# Patient Record
Sex: FEMALE | Race: WHITE
[De-identification: names, ages, dates, MRNs, and addresses within clinical notes are randomized per-mention and may not be internally consistent; named-entity substitution may affect disease eponyms.]

---

## 2017-10-31 RX ADMIN — HEPARIN PRN UNIT: 100 SYRINGE at 12:49

## 2017-11-30 LAB — PLATELET COUNT, AUTOMATED: 242 K/UL (ref 150–450)

## 2017-11-30 RX ADMIN — HEPARIN PRN UNIT: 100 SYRINGE at 10:25

## 2017-11-30 NOTE — ONC PROGRESS NOTE - NP.HALSEY
Patient History


Date of Service


Nov 30, 2017





Reason For Visit/HPI


Celsa is here today for a routine follow up visit for surveillance of 

endometrial carcinoma.  





HISTORY OF PRESENT ILLNESS


Celsa is here today for a routine follow up visit for surveillance of 

endometrial carcinoma. She is doing well in general, except for residual 

chronic neuropathy due chemotherapy.  She is currently taking gabapentin 600 mg 

twice daily, with only minimal relief.  She has also increased urinary 

frequency and incontinence. She has chronic edema of the right lower extremity, 

but otherwise she denies acute concerns.





Problem List





(1) Adenocarcinoma of endometrium, stage 3


(2) Peripheral neuropathy





Oncology History


Patient is 84-year-old female who was diagnosed with stage IIIC-1 in August of 2014 after she developed abnormal vaginal bleeding. She underwent endometrial 

biopsy on August 5, 2014 which was positive for endometrial adenocarcinoma, 

FIGO grade 2-3.  Patient was referred to Dr. Tara Wright and underwent total 

abdominal hysterectomy with bilateral salpingo-oophorectomy with 

lymphadenectomy and pelvic washing done on August 20, 2014.  Final pathology 

was positive for endometrioid adenocarcinoma, FIGO grade 3.  The tumor was 5 cm 

with 100% invasion into the myometrium with focal serosal involvement.  

Lymphovascular invasion was positive, but no cervical stromal or parametrial 

involvement.  She was staged as stage IIIC-1 (pT3a pN1).  She received adjuvant 

chemotherapy with carboplatin and Taxol for four cycles, completed on January 8 , 2015.  She also received external beam radiation therapy and intracavitary 

radiation therapy. Her last  from 7/2017 was 5.  from today is 

pending.





Medical History


Past Medical History


1.  Uterine cancer. 


2.  Melanoma of the cheek in 2006. 


3.  Pancreatitis in 2013.  


4.  Atrial fibrillation. 


5. 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


Past Surgical History


1.  Radiacl hysterectomy and BSO. 


2.  Mastectomy. 


3.  Tonsillectomy.


Family History:  


FH: breast cancer


  MOTHER, Onset:68


FH: colon cancer


  GRANDFATHER, Onset:70





Psychosocial History


Social History


Patient never smoked.  She reports she does not drink alcohol or use illicit 

drugs.


Smoking History:  No


Smoking Status:  Never Smoker


Exposure to Second Hand Smoke?:  No





Medications and Allergies


Active Scripts


Gabapentin (GABAPENTIN) 300 Mg Capsule, 300 MG PO BID, #60 CAPSULE


   Prov:ADAN CHRISTINE MD         12/21/16


Reported Medications


Metoprolol Tartrate (METOPROLOL TARTRATE) 25 Mg Tablet, 25 MG PO BID, TAB


   11/30/17


Pyridoxine Hcl (VITAMIN B-6) 25 Mg Tablet, 25 MG PO DAILY


   11/30/17


Cyanocobalamin (Vitamin B-12) (VITAMIN B-12) 1,000 Mcg Tablet, 1000 MCG PO DAILY


   11/30/17


Furosemide (LASIX) 20 Mg Tablet, 1 TAB PO PRN, TAB


   5/5/17


Potassium Gluconate (POTASSIUM) 99 Mg Tablet, 99 MG PO PRN


   5/5/17


Apixaban (ELIQUIS) 2.5 Mg Tablet, 2.5 MG PO


   12/23/16


Multivitamin (MULTI-VITAMIN DAILY) 1 Each Tablet, 1 EACH PO BID


   11/24/16


Calcium Carbonate (CALCIUM) 500 Mg Tab.chew, 500 MG PO BID, TAB.CHEW


   11/24/16


Discontinued Reported Medications


Carvedilol (COREG) 12.5 Mg Tablet, 12.5 MG PO BID, #10 TAB


   5/5/17


Allergies:  


Coded Allergies:  


     No Known Drug Allergies (Unverified , 8/11/17)





Review of System/Physical Exam


Review of Systems


Constitutional:  Denies Appetite/Weight Change, Denies Fever/Chills/Sweating, 

Denies Recent Infection, Denies Other


Cardiovascular:  Denies Chest Pain, Denies Orthopnea, Denies Edema, Denies 

Palpitations, Denies Other


Respiratory:  Denies Cough, Denies Expectoration, Denies Hemoptysis, Denies 

Shortness of Breath, Denies Wheezing, Denies Other


HEENT:  No Tinnitus, No Hearing Problems, No Epistaxis, No Nasal Discharge, No 

Sore Throat, No Mouth Ulcers, No Other


Gastrointestinal:  Other, 


   No Nausea, No Vomitting, No Diarrhea, No Constipation, No Heart Burn, No 

Swallowing Difficulties, No Abdominal Pain


Genitourinary:  Positive for Other (Urinary frequence and incontinence. )


Endocrine:  Denies Diabetes, Denies Hot Flashes, Denies Thyroid Issues, Denies 

Enlarged Lymph Nodes, Denies Other


Hematologic:  Denies Bruising, Denies Bleeding, Denies Fatigue, Denies Weakness

, Denies Other


Musculoskeletal:  Positive for Other (Weakness. )


Neurologic:  Numbness of Hands, Tingling of Hands, Numbness of Feet, Tingling 

of Feet


Psychiatric:  No Anxiety, No Depression, No Other


Skin:  Denies Skin Rash, Denies Lumps, Denies Erythema, Denies Dry Skin, Denies 

Moist Skin, Denies Other





Physical Exam


Vital Signs





Temperature:         97.0 


Pulse:                    115 


BP Systolic:           128 


BP Diastolic:          85 


Respiratory Rate:   16 


O2 SAT:                91 


O2 Delivery:          Room Air  





Height (inches)      65.00  


Weight lb:             145 


Weight oz:            6.0 


Weight Kg (Jeromy):   67.22  





Pain:                    0


General:  Stable, Well Developed, Well Nourished, Not In Acute Distress, Not 

Other


HEENT:  No Trauma, No Conjunctivitis, No Icterus, No Mucositis, No Oral Thrush, 

No Sinus Tenderness, No Other


Neck:  Supple, No Thyromegaly, No Other


Lungs:  Clear to Auscultation, Not Percussed Bilaterally, Not Other


Heart:  Regular Rate, Regular Rhythm, No Gallops, No Murmurs


Abdomen:  Soft and Nontender, No Hepatosplenomegaly, No Masses


Extremities:  No Cyanosis, No Clubbing, No Edema, No Other


Lymphadenopathy:  None


Psychiatric:  Mood appears normal, Affect appears normal


Skin:  No Skin Rashes, No Bruising, No Purpura, No Moist Desquamation, No Dry 

Desquamation, No Erythema, No Mild Erythema, No Moderate Erythema, No Severe 

Erythema, No Induration, No Other


Other


Port in left upper chest noted.





Diagnostic Studies


Diagnostic Studies


Laboratory


 Laboratory Tests


11/30/17 10:30








Laboratory Tests


11/30/17 10:30: 


White Blood Count 5.9, Red Blood Count 4.33, Hemoglobin 13.7, Hematocrit 39.9, 

Mean Corpuscular Volume 92.1, Mean Corpuscular Hemoglobin 31.6, Mean 

Corpuscular Hemoglobin Concent 34.3, Red Cell Distribution Width 14.7, Platelet 

Count 242, Mean Platelet Volume 6.6, Neutrophils (%) (Auto) 72.0, Lymphocytes (%

) (Auto) 16.1, Monocytes (%) (Auto) 8.5, Eosinophils (%) (Auto) 2.3, Basophils (

%) (Auto) 1.1, Nucleated RBC Relative Count (auto) 0.1, Neutrophils # (Auto) 4.2

, Lymphocytes # (Auto) 0.9, Monocytes # (Auto) 0.5, Eosinophils # (Auto) 0.1, 

Basophils # (Auto) 0.1, Nucleated RBC Absolute Count (auto) 0.01, Sodium Level 

140, Potassium Level 4.1, Chloride Level 108, Carbon Dioxide Level 23, Blood 

Urea Nitrogen 13, Creatinine 0.80, Glomerular Filtration Rate Calc > 60.0, 

Random Glucose 86, Calcium Level 9.0, Total Bilirubin 0.7, Aspartate Amino 

Transf (AST/SGOT) 19, Alanine Aminotransferase (ALT/SGPT) 28, Alkaline 

Phosphatase 78, Total Protein 6.6, Albumin 3.8





Assessment and Plan


Assessment & Plan





1.  Stage IIIC (pT3a pN1c M0) endometrial adenocarcinoma, FIGO grade 3 with 100

% invasion of the myometrium. Celsa received adjuvant chemotherapy with 

carboplatin and Taxol for six cycles, completed January 8, 2015.  She received 

also adjuvant external beam radiation therapy and intracavitary seed implant 

therapy.  She is doing very well clinically and without evidence of recurrent 

disease. CA-125 was 5 last visit 7/2017 and pending today. She will return to 

the office for a follow up visit in four months with CBC, CMP and . She 

will have her port flushed monthly. 


2.  Chemotherapy-induced neuropathy. She will continue gabapentin 600 mg twice 

daily.


3.  History of atrial fibrillation. Anticoagulated with Eliquis. 


4.  History of melanoma of the cheek in 2006.





PLAN


1.  Continue active surveillance. 


2.  Patient to return for follow up in 4 months with CBC, CMP and CA-125.


3.  Continue Gabapentin 600 mg 2 times daily.


4.  Patient to contact the clinic for any problems or concerns. 








I personally spent a total of 25 minutes in this visit. Of that 15 minutes was 

counseling/coordination of patient's care.  


See my note above for details.











ALBERTO SALINAS MD Nov 30, 2017 11:43

## 2018-01-08 ENCOUNTER — HOSPITAL ENCOUNTER (OUTPATIENT)
Dept: HOSPITAL 89 - SPU | Age: 83
LOS: 21 days | End: 2018-01-29
Attending: INTERNAL MEDICINE
Payer: MEDICARE

## 2018-01-08 VITALS
BODY MASS INDEX: 23.8 KG/M2 | HEIGHT: 65 IN | WEIGHT: 142.86 LBS | HEIGHT: 65 IN | WEIGHT: 142.86 LBS | BODY MASS INDEX: 23.8 KG/M2

## 2018-01-08 VITALS — DIASTOLIC BLOOD PRESSURE: 58 MMHG | SYSTOLIC BLOOD PRESSURE: 112 MMHG

## 2018-01-08 DIAGNOSIS — Z92.3: ICD-10-CM

## 2018-01-08 DIAGNOSIS — G62.9: ICD-10-CM

## 2018-01-08 DIAGNOSIS — Z85.42: Primary | ICD-10-CM

## 2018-01-08 DIAGNOSIS — Z92.21: ICD-10-CM

## 2018-01-08 DIAGNOSIS — Z79.899: ICD-10-CM

## 2018-01-08 DIAGNOSIS — I48.91: ICD-10-CM

## 2018-01-08 DIAGNOSIS — Z85.820: ICD-10-CM

## 2018-01-08 PROCEDURE — 84295 ASSAY OF SERUM SODIUM: CPT

## 2018-01-08 PROCEDURE — 82040 ASSAY OF SERUM ALBUMIN: CPT

## 2018-01-08 PROCEDURE — 85025 COMPLETE CBC W/AUTO DIFF WBC: CPT

## 2018-01-08 PROCEDURE — 82565 ASSAY OF CREATININE: CPT

## 2018-01-08 PROCEDURE — 84520 ASSAY OF UREA NITROGEN: CPT

## 2018-01-08 PROCEDURE — 84075 ASSAY ALKALINE PHOSPHATASE: CPT

## 2018-01-08 PROCEDURE — 82247 BILIRUBIN TOTAL: CPT

## 2018-01-08 PROCEDURE — 84155 ASSAY OF PROTEIN SERUM: CPT

## 2018-01-08 PROCEDURE — 84450 TRANSFERASE (AST) (SGOT): CPT

## 2018-01-08 PROCEDURE — 36591 DRAW BLOOD OFF VENOUS DEVICE: CPT

## 2018-01-08 PROCEDURE — 86304 IMMUNOASSAY TUMOR CA 125: CPT

## 2018-01-08 PROCEDURE — 82947 ASSAY GLUCOSE BLOOD QUANT: CPT

## 2018-01-08 PROCEDURE — 84460 ALANINE AMINO (ALT) (SGPT): CPT

## 2018-01-08 PROCEDURE — 82310 ASSAY OF CALCIUM: CPT

## 2018-01-08 PROCEDURE — 82435 ASSAY OF BLOOD CHLORIDE: CPT

## 2018-01-08 PROCEDURE — 96523 IRRIG DRUG DELIVERY DEVICE: CPT

## 2018-01-08 PROCEDURE — 84132 ASSAY OF SERUM POTASSIUM: CPT

## 2018-01-08 PROCEDURE — 82374 ASSAY BLOOD CARBON DIOXIDE: CPT

## 2018-01-08 RX ADMIN — HEPARIN PRN UNIT: 100 SYRINGE at 11:39

## 2018-01-12 ENCOUNTER — HOSPITAL ENCOUNTER (EMERGENCY)
Dept: HOSPITAL 89 - ER | Age: 83
Discharge: HOME | End: 2018-01-12
Payer: MEDICARE

## 2018-01-12 VITALS
WEIGHT: 145.38 LBS | WEIGHT: 145.38 LBS | HEIGHT: 65 IN | HEIGHT: 65 IN | BODY MASS INDEX: 24.22 KG/M2 | BODY MASS INDEX: 24.22 KG/M2

## 2018-01-12 VITALS — DIASTOLIC BLOOD PRESSURE: 89 MMHG | SYSTOLIC BLOOD PRESSURE: 148 MMHG

## 2018-01-12 DIAGNOSIS — I48.91: Primary | ICD-10-CM

## 2018-01-12 DIAGNOSIS — R06.02: ICD-10-CM

## 2018-01-12 DIAGNOSIS — R55: ICD-10-CM

## 2018-01-12 DIAGNOSIS — R07.89: ICD-10-CM

## 2018-01-12 LAB — PLATELET COUNT, AUTOMATED: 283 K/UL (ref 150–450)

## 2018-01-12 PROCEDURE — 84295 ASSAY OF SERUM SODIUM: CPT

## 2018-01-12 PROCEDURE — 84132 ASSAY OF SERUM POTASSIUM: CPT

## 2018-01-12 PROCEDURE — 82247 BILIRUBIN TOTAL: CPT

## 2018-01-12 PROCEDURE — 82435 ASSAY OF BLOOD CHLORIDE: CPT

## 2018-01-12 PROCEDURE — 82947 ASSAY GLUCOSE BLOOD QUANT: CPT

## 2018-01-12 PROCEDURE — 96360 HYDRATION IV INFUSION INIT: CPT

## 2018-01-12 PROCEDURE — 82310 ASSAY OF CALCIUM: CPT

## 2018-01-12 PROCEDURE — 84450 TRANSFERASE (AST) (SGOT): CPT

## 2018-01-12 PROCEDURE — 84520 ASSAY OF UREA NITROGEN: CPT

## 2018-01-12 PROCEDURE — 99284 EMERGENCY DEPT VISIT MOD MDM: CPT

## 2018-01-12 PROCEDURE — 85379 FIBRIN DEGRADATION QUANT: CPT

## 2018-01-12 PROCEDURE — 83735 ASSAY OF MAGNESIUM: CPT

## 2018-01-12 PROCEDURE — 93005 ELECTROCARDIOGRAM TRACING: CPT

## 2018-01-12 PROCEDURE — 84075 ASSAY ALKALINE PHOSPHATASE: CPT

## 2018-01-12 PROCEDURE — 85025 COMPLETE CBC W/AUTO DIFF WBC: CPT

## 2018-01-12 PROCEDURE — 71045 X-RAY EXAM CHEST 1 VIEW: CPT

## 2018-01-12 PROCEDURE — 84484 ASSAY OF TROPONIN QUANT: CPT

## 2018-01-12 PROCEDURE — 96361 HYDRATE IV INFUSION ADD-ON: CPT

## 2018-01-12 PROCEDURE — 83880 ASSAY OF NATRIURETIC PEPTIDE: CPT

## 2018-01-12 PROCEDURE — 84460 ALANINE AMINO (ALT) (SGPT): CPT

## 2018-01-12 PROCEDURE — 70450 CT HEAD/BRAIN W/O DYE: CPT

## 2018-01-12 PROCEDURE — 82374 ASSAY BLOOD CARBON DIOXIDE: CPT

## 2018-01-12 PROCEDURE — 84155 ASSAY OF PROTEIN SERUM: CPT

## 2018-01-12 PROCEDURE — 82565 ASSAY OF CREATININE: CPT

## 2018-01-12 PROCEDURE — 82040 ASSAY OF SERUM ALBUMIN: CPT

## 2018-01-12 NOTE — RADIOLOGY IMAGING REPORT
FACILITY: [facility] [place] 

 

PATIENT NAME: Celsa العلي

: 1932

MR: [id number]

V: [id number]

EXAM DATE: 

ORDERING PHYSICIAN: CHARLOTTE CHAVEZ

TECHNOLOGIST: 

 

Location: [facility] [place]

Patient: Celsa العلي

: 1932

MRN: [id number]

Visit/Account:[id number]

Date of Sevice:  2018

 

ACCESSION #: [id number]

 

Head CT scan without contrast

 

COMPARISONS: 2016

 

HISTORY: Syncope

 

TECHNIQUE:  Non-contrast head CT was performed with sagittal and coronal reformations.

 

One of the following dose optimization techniques was utilized in the performance of this exam: autom
ated exposure control; adjustment of the mA and/or kV according to patient size; or use of iterative 
reconstruction technique.  Specific details can be referenced in the facility's radiology CT exam ope
rational policy.

 

FINDINGS:

 

There is no intracranial hemorrhage, hydrocephalus or midline shift.  The basal cisterns, gray-white 
differentiation, and convexity sulci are maintained. Normal orbital soft tissues. Unchanged perivascu
lar space versus chronic lacunar infarct in the left basal ganglia.

 

The mastoid air cells are clear.  The paranasal sinuses are clear.  The osseous structures are normal
.

 

IMPRESSION:

 

No acute intracranial abnormality.

 

 

 

Report Dictated By: Wes So MD at 2018 3:11 PM

 

Report E-Signed By: Wes So MD  at 2018 3:14 PM

 

WSN:[id number]

## 2018-01-12 NOTE — RADIOLOGY IMAGING REPORT
FACILITY: [facility] 

 

PATIENT NAME: Celsa العلي

: 1932

MR: [id number]

V: [id number]

EXAM DATE: 

ORDERING PHYSICIAN: HCARLOTTE CHAVEZ

TECHNOLOGIST: 

 

Location: [facility] [place]

Patient: Celsa العلي

: 1932

MRN: [id number]

Visit/Account:[id number]

Date of Sevice:  2018

 

ACCESSION #: [id number]

 

Exam type: CHEST SINGLE AP

 

History: Chest pain

 

Comparison: 2017.

 

Findings:

 

Again noted is hyperexpansion the lung fields.  There is no evidence of a pneumothorax or pneumomedia
stinum.  No evidence of focal infiltrates pleural effusions or pulmonary edema.  Cardiac silhouette i
s normal.  Left-sided implanted port remains unchanged

 

IMPRESSION:

 

1.  Mild hyperexpansion lung fields although no evidence of acute pulmonary consolidation

 

Report Dictated By: Purnima Ely MD at 2018 1:34 PM

 

Report E-Signed By: Purnima Ely MD  at 2018 1:35 PM

 

WSN:[id number]

## 2018-01-12 NOTE — ER REPORT
History and Physical


Time Seen By MD:  13:06


HPI/ROS


CC:


Palpitations





HPI:


85-year-old female with past medical history of essential hypertension, vertigo

, peripheral neuropathy, adenocarcinoma of the endometrium, atrial fibrillation 

transient, glaucoma, urinary tract infections, closed a history of fracture. 

Patient starting at approximately 10:30 this morning had a rapid heart rate 

which she assumed was atrial fibrillation. It is coming gone. She then had 

chest pressure. That has resolved but she did come to the emergency department. 

She was nauseated at that time and slight diaphoresis but at present she is 

without nausea or diaphoresis. Her shortness of breath has resolved. There are 

no other complaints. Her pain scale is 0 out of 10. The tachycardia was 

transient.





ROS:


12 point review of systems essentially negative other than what's mentioned in 

history of present illness.





NURSES AND OLD MEDICAL RECORDS:


Reviewed





PMH:


Reviewed





SURGICAL HX:


Reviewed





FAMILY HX:


Noncontributory





SOCIAL HX:


Patient denies smoking alcohol or illicit drugs.





VITAL SIGNS:


Reviewed





CONSTITUTIONAL:


85-year-old female in minimal to moderate distress.





PHYSICAL EXAM:


HEENT:


Pupils equal round reactive to light and accommodate, EOMI, tympanic membranes 

pearly white umbo present with good light reflex. Lips dry mucous membranes 

moist gums nonbleeding uvula midline and rises equally with phonation, 

oropharynx noninjected, teeth intact.





NECK:


Neck supple, thyroid not appreciated, anterior and posterior cervical 

lymphadenopathy not appreciated. Trachea midline and rises equally with 

phonation.





CARDIAC:


S1-S2 regular rate rhythm no murmurs rubs or gallops.





LUNGS:


Lungs clear bilaterally posteriorly in all fields. Good air movement.





ABDOMEN:


Abdomen soft, nondistended, bowel sounds active in all 4 quadrants, no bruits 

noted, no CVA tenderness.





MUSCULOSKELETAL:


Strength 5 out of 5 x 4 extremities, no deformities noted.





NEUROLOGIC:


Patient alert and oriented by 3


Allergies:  


Coded Allergies:  


     No Known Drug Allergies (Unverified , 8/11/17)


Home Meds


Active Scripts


Gabapentin (GABAPENTIN) 300 Mg Capsule, 300 MG PO BID, #60 CAPSULE


   Prov:ADAN CHRISTINE MD         12/21/16


Reported Medications


Metoprolol Tartrate (METOPROLOL TARTRATE) 25 Mg Tablet, 25 MG PO BID, TAB


   11/30/17


Pyridoxine Hcl (VITAMIN B-6) 25 Mg Tablet, 25 MG PO DAILY


   11/30/17


Cyanocobalamin (Vitamin B-12) (VITAMIN B-12) 1,000 Mcg Tablet, 1000 MCG PO DAILY


   11/30/17


Apixaban (ELIQUIS) 2.5 Mg Tablet, 2.5 MG PO


   12/23/16


Multivitamin (MULTI-VITAMIN DAILY) 1 Each Tablet, 1 EACH PO BID


   11/24/16


Calcium Carbonate (CALCIUM) 500 Mg Tab.chew, 500 MG PO BID, TAB.CHEW


   11/24/16


Discontinued Reported Medications


Furosemide (LASIX) 20 Mg Tablet, 1 TAB PO PRN, TAB


   5/5/17


Potassium Gluconate (POTASSIUM) 99 Mg Tablet, 99 MG PO PRN


   5/5/17


Hx Smoking:  No


Smoking Status:  Never Smoker


Exposure to Second Hand Smoke?:  No


Hx Substance Use Disorder:  No


Hx Alcohol Use:  Yes (nando before bedtime)


Constitutional





Vital Sign - Last 24 Hours








 1/12/18 1/12/18 1/12/18 1/12/18





 13:09 13:10 13:15 13:30


 


Temp  97.8  


 


Pulse  63 64 ???


 


Resp  18 25 


 


B/P (MAP) 144/83 (103) 144/83  


 


Pulse Ox  97 96 


 


O2 Delivery  Room Air  


 


    





 1/12/18 1/12/18 1/12/18 1/12/18





 13:36 13:45 14:00 14:36


 


Pulse  64 60 66


 


Resp  10 8 


 


B/P (MAP) 156/83 (107)  125/76 (92) 155/76 (102)


 


Pulse Ox  94 92 94


 


O2 Delivery    Room Air





 1/12/18 1/12/18  





 14:39 14:42  


 


Pulse 68 69  


 


B/P (MAP) 140/94 (109) 158/84 (108)  


 


Pulse Ox 96 95  


 


O2 Delivery Room Air Room Air  











Medical Decision Making


Data Points


Result Diagram:  


1/12/18 1320                                                                   

             1/12/18 1320





Laboratory





Hematology








Test


  1/12/18


13:20


 


Red Blood Count


  4.83 M/uL


(4.17-5.56)


 


Mean Corpuscular Volume


  90.9 fL


(80.0-96.0)


 


Mean Corpuscular Hemoglobin


  31.0 pg


(26.0-33.0)


 


Mean Corpuscular Hemoglobin


Concent 34.1 g/dL


(32.0-36.0)


 


Red Cell Distribution Width


  14.3 %


(11.5-14.5)


 


Mean Platelet Volume


  7.1 fL


(7.2-11.1)


 


Neutrophils (%) (Auto)


  77.5 %


(39.4-72.5)


 


Lymphocytes (%) (Auto)


  12.8 %


(17.6-49.6)


 


Monocytes (%) (Auto)


  6.7 %


(4.1-12.4)


 


Eosinophils (%) (Auto)


  2.1 %


(0.4-6.7)


 


Basophils (%) (Auto)


  0.9 %


(0.3-1.4)


 


Nucleated RBC Relative Count


(auto) 0.1 /100WBC 


 


 


Neutrophils # (Auto)


  4.7 K/uL


(2.0-7.4)


 


Lymphocytes # (Auto)


  0.8 K/uL


(1.3-3.6)


 


Monocytes # (Auto)


  0.4 K/uL


(0.3-1.0)


 


Eosinophils # (Auto)


  0.1 K/uL


(0.0-0.5)


 


Basophils # (Auto)


  0.1 K/uL


(0.0-0.1)


 


Nucleated RBC Absolute Count


(auto) 0.00 K/uL 


 


 


D-Dimer Quantitative (PE/DVT)


  0.31 ug/ml


(0-0.50)


 


Sodium Level


  141 mmol/L


(137-145)


 


Potassium Level


  3.7 mmol/L


(3.5-5.0)


 


Chloride Level


  109 mmol/L


()


 


Carbon Dioxide Level


  22 mmol/L


(22-31)


 


Blood Urea Nitrogen


  17 mg/dl


(7-18)


 


Creatinine


  0.80 mg/dl


(0.52-1.04)


 


Glomerular Filtration Rate


Calc > 60.0 


 


 


Random Glucose


  106 mg/dl


()


 


Calcium Level


  9.8 mg/dl


(8.4-10.2)


 


Magnesium Level


  1.9 mg/dl


(1.7-2.2)


 


Total Bilirubin


  0.7 mg/dl


(0.2-1.3)


 


Aspartate Amino Transf


(AST/SGOT) 20 U/L (0-35) 


 


 


Alanine Aminotransferase


(ALT/SGPT) 29 U/L (0-56) 


 


 


Alkaline Phosphatase 68 U/L (0-126) 


 


Troponin I < 0.012 ng/ml 


 


B-Type Natriuretic Peptide


  581 pg/ml


(0-100)


 


Total Protein


  6.8 gm/dl


(6.3-8.2)


 


Albumin


  3.9 g/dl


(3.5-5.0)








Chemistry








Test


  1/12/18


13:20


 


White Blood Count


  6.0 k/uL


(4.5-11.0)


 


Red Blood Count


  4.83 M/uL


(4.17-5.56)


 


Hemoglobin


  15.0 g/dL


(12.0-16.0)


 


Hematocrit


  43.9 %


(34.0-47.0)


 


Mean Corpuscular Volume


  90.9 fL


(80.0-96.0)


 


Mean Corpuscular Hemoglobin


  31.0 pg


(26.0-33.0)


 


Mean Corpuscular Hemoglobin


Concent 34.1 g/dL


(32.0-36.0)


 


Red Cell Distribution Width


  14.3 %


(11.5-14.5)


 


Platelet Count


  283 K/uL


(150-450)


 


Mean Platelet Volume


  7.1 fL


(7.2-11.1)


 


Neutrophils (%) (Auto)


  77.5 %


(39.4-72.5)


 


Lymphocytes (%) (Auto)


  12.8 %


(17.6-49.6)


 


Monocytes (%) (Auto)


  6.7 %


(4.1-12.4)


 


Eosinophils (%) (Auto)


  2.1 %


(0.4-6.7)


 


Basophils (%) (Auto)


  0.9 %


(0.3-1.4)


 


Nucleated RBC Relative Count


(auto) 0.1 /100WBC 


 


 


Neutrophils # (Auto)


  4.7 K/uL


(2.0-7.4)


 


Lymphocytes # (Auto)


  0.8 K/uL


(1.3-3.6)


 


Monocytes # (Auto)


  0.4 K/uL


(0.3-1.0)


 


Eosinophils # (Auto)


  0.1 K/uL


(0.0-0.5)


 


Basophils # (Auto)


  0.1 K/uL


(0.0-0.1)


 


Nucleated RBC Absolute Count


(auto) 0.00 K/uL 


 


 


D-Dimer Quantitative (PE/DVT)


  0.31 ug/ml


(0-0.50)


 


Glomerular Filtration Rate


Calc > 60.0 


 


 


Calcium Level


  9.8 mg/dl


(8.4-10.2)


 


Magnesium Level


  1.9 mg/dl


(1.7-2.2)


 


Total Bilirubin


  0.7 mg/dl


(0.2-1.3)


 


Aspartate Amino Transf


(AST/SGOT) 20 U/L (0-35) 


 


 


Alanine Aminotransferase


(ALT/SGPT) 29 U/L (0-56) 


 


 


Alkaline Phosphatase 68 U/L (0-126) 


 


Troponin I < 0.012 ng/ml 


 


B-Type Natriuretic Peptide


  581 pg/ml


(0-100)


 


Total Protein


  6.8 gm/dl


(6.3-8.2)


 


Albumin


  3.9 g/dl


(3.5-5.0)








Coagulation








Test


  1/12/18


13:20


 


D-Dimer Quantitative (PE/DVT) 0.31 ug/ml 











EKG/Imaging


EKG Interpretation


Neurosensory rhythm, ST of mL and possible dig effect, ventricular rate 64 bpm, 

TX interval 160 ms, QRS duration 84 seconds,  ms,  ms.


Imaging


Chest x-ray:





IMPRESSION:


 


1.  Mild hyperexpansion lung fields although no evidence of acute pulmonary 

consolidation





CT of the brain:


 


IMPRESSION:


 


No acute intracranial abnormality.





ED Course/Re-evaluation


ED Course


After the patient had been in the ED for a half she showed me that after she 

experienced a rapid heart rate she then had short visual disturbances. The 1st 

time she states that she is [place]. The 2nd time she states that she saw 

the mountains. Slightly nauseated after each event. We'll proceed with CT of 

the brain.


Re-evaluation


Medical decision making includes but not conclusive to atrial fibrillation, 

pneumonia, bacterial infection,


Decision to Disposition Date:  Jan 12, 2018


Decision to Disposition Time:  15:41





Depart


Departure


Latest Vital Signs





Vital Signs








  Date Time  Temp Pulse Resp B/P (MAP) Pulse Ox O2 Delivery O2 Flow Rate FiO2


 


1/12/18 14:42  69  158/84 (108) 95 Room Air  


 


1/12/18 14:00   8     


 


1/12/18 13:10 97.8       








Impression:  


 Primary Impression:  


 Transient atrial fibrillation


Condition:  Improved


Disposition:  HOME OR SELF-CARE


Referrals:  


CANDIS CANALES (PCP)


Departure Forms:  ER Transition Record, Medications Reconciliation,    Patient 

Portal Information


Patient Instructions:  A-fib (Atrial Fibrillation) (ED)





Additional Instructions:  


Follow-up with your regular doctor ASAP. I discussed with her what happened 

today and she wants to see you again sewn. The CT of your head is within normal 

limits there is no pathology.





I and the staff wanted to thank you for allowing us to take care of your needs 

today in the emergency department at [facility]. We have tried to 

answer all of your questions and concerns. Please feel free to return to the 

emergency department for any further concerns or unanswered questions.











CHARLOTTE CHAVEZ MD Jan 12, 2018 13:06

## 2018-01-12 NOTE — EKG
FACILITY: [facility] 

 

PATIENT NAME: JOEY NORTH

: [id number]

MR: [id number]

V: [id number]

EXAM DATE: 

ORDERING PHYSICIAN: CHARLOTTE CHAVEZ

TECHNOLOGIST: 

 

Test Reason : 

Blood Pressure : ***/*** mmHG

Vent. Rate : 064 BPM     Atrial Rate : 064 BPM

   P-R Int : 160 ms          QRS Dur : 084 ms

    QT Int : 426 ms       P-R-T Axes : 056 053 063 degrees

   QTc Int : 439 ms

 

Normal sinus rhythm

ST abnormality, possible digitalis effect

Abnormal ECG

When compared with ECG of 11-AUG-2017 15:47,

Sinus rhythm has replaced Atrial fibrillation

Vent. rate has decreased BY  45 BPM

T wave inversion no longer evident in Lateral leads

QT has shortened

Confirmed by МАРИЯ COPELAND ([id number]) on 2018 6:56:33 PM

 

Referred By:             Confirmed By:МАРИЯ COPELAND

## 2018-01-19 ENCOUNTER — HOSPITAL ENCOUNTER (OUTPATIENT)
Dept: HOSPITAL 89 - MRI | Age: 83
End: 2018-01-19
Attending: NURSE PRACTITIONER
Payer: MEDICARE

## 2018-01-19 VITALS — BODY MASS INDEX: 24.96 KG/M2

## 2018-01-19 DIAGNOSIS — S76.912A: Primary | ICD-10-CM

## 2018-01-19 DIAGNOSIS — S76.911A: ICD-10-CM

## 2018-01-19 NOTE — RADIOLOGY IMAGING REPORT
FACILITY: [facility] [place] 

 

PATIENT NAME: Celsa العلي

: 1932

MR: [id number]

V: [id number]

EXAM DATE: 

ORDERING PHYSICIAN: CANDIS CANALES

TECHNOLOGIST: 

 

Location: [facility]

Patient: Celsa العلي

: 1932

MRN: [id number]

Visit/Account:[id number]

Date of Sevice:  2018

 

ACCESSION #: [id number]

 

HIP LEFT W/O CONTRAST

 

COMPARISON:  None.

 

HISTORY:  Left hip pain since a fall 5 months ago.

 

TECHNIQUE:  Noncontrast multiplanar MRI of the pelvis and left hip utilizing T1 weighted and fluid se
nsitive sequences.

 

CONTRAST:  None.

 

FINDINGS:

The femoral heads are well formed and seated within well formed acetabula. There is no femoral avascu
lar necrosis, fracture or significant osteoarthritis. There is susceptibility artifact in the right p
roximal femur, due to internal fixation hardware. This partially obscures the right femur and immedia
tely adjacent right hip soft tissues. On the small field-of-view images of the left hip there is abno
rmal linear signal undercutting the anterior superior labrum consistent with a labral tear.

 

There are moderate to advanced degenerative changes in the partially imaged lower lumbar spine. The s
acroiliac joints and pubic symphysis are intact and unremarkable. There is no marrow signal abnormali
ty or osseous malalignment.

 

There is no hip effusion, iliopsoas or trochanteric bursitis. There is a 1.8 x 3.4 x 7.6 cm fluid col
lection adjacent to the right hip greater trochanter most consistent with chronic postoperative fluid
 collection.

 

There is no muscle edema. Probable mild atrophy of the right sided gluteal musculature on the coronal
 T1 series.

 

The visualized tendinous origins and tendinous insertions of the gluteal and iliopsoas muscles are in
tact. The visualized origins of the quadriceps and adductor muscle groups are intact. There is fluid 
signal consistent with partial-thickness tears at the hamstrings origins bilaterally, right more than
 left.

 

Mild sigmoid diverticulosis. No other intrapelvic visceral abnormalities are seen.

 

 

IMPRESSION:

1.  Findings consistent with a tear in the anterior superior labrum of the left hip.

2.  Moderate to advanced degenerative changes in the partially imaged lower lumbar spine.

3.  Previous internal fixation of a right hip fracture without postoperative fluid collection in the 
right hip soft tissues adjacent to the greater trochanter.

4.  Partial-thickness tears of the hamstrings origins bilaterally, right more than left.

 

 

Report Dictated By: Michael Franklin at 2018 11:46 AM

 

Report E-Signed By: Michael Franklin  at 2018 11:57 AM

 

WSN:[id number]

## 2018-02-02 ENCOUNTER — HOSPITAL ENCOUNTER (EMERGENCY)
Dept: HOSPITAL 89 - ER | Age: 83
Discharge: HOME | End: 2018-02-02
Payer: MEDICARE

## 2018-02-02 VITALS
WEIGHT: 145.38 LBS | HEIGHT: 65 IN | BODY MASS INDEX: 24.22 KG/M2 | BODY MASS INDEX: 24.22 KG/M2 | WEIGHT: 145.38 LBS | HEIGHT: 65 IN

## 2018-02-02 VITALS — SYSTOLIC BLOOD PRESSURE: 159 MMHG | DIASTOLIC BLOOD PRESSURE: 106 MMHG

## 2018-02-02 DIAGNOSIS — R23.2: ICD-10-CM

## 2018-02-02 DIAGNOSIS — R55: Primary | ICD-10-CM

## 2018-02-02 LAB
INR PPP: 1.23
PLATELET COUNT, AUTOMATED: 266 K/UL (ref 150–450)

## 2018-02-02 PROCEDURE — 82374 ASSAY BLOOD CARBON DIOXIDE: CPT

## 2018-02-02 PROCEDURE — 82040 ASSAY OF SERUM ALBUMIN: CPT

## 2018-02-02 PROCEDURE — 85730 THROMBOPLASTIN TIME PARTIAL: CPT

## 2018-02-02 PROCEDURE — 85025 COMPLETE CBC W/AUTO DIFF WBC: CPT

## 2018-02-02 PROCEDURE — 84450 TRANSFERASE (AST) (SGOT): CPT

## 2018-02-02 PROCEDURE — 84155 ASSAY OF PROTEIN SERUM: CPT

## 2018-02-02 PROCEDURE — 82310 ASSAY OF CALCIUM: CPT

## 2018-02-02 PROCEDURE — 84075 ASSAY ALKALINE PHOSPHATASE: CPT

## 2018-02-02 PROCEDURE — 84484 ASSAY OF TROPONIN QUANT: CPT

## 2018-02-02 PROCEDURE — 70450 CT HEAD/BRAIN W/O DYE: CPT

## 2018-02-02 PROCEDURE — 84460 ALANINE AMINO (ALT) (SGPT): CPT

## 2018-02-02 PROCEDURE — 84295 ASSAY OF SERUM SODIUM: CPT

## 2018-02-02 PROCEDURE — 82947 ASSAY GLUCOSE BLOOD QUANT: CPT

## 2018-02-02 PROCEDURE — 85610 PROTHROMBIN TIME: CPT

## 2018-02-02 PROCEDURE — 71045 X-RAY EXAM CHEST 1 VIEW: CPT

## 2018-02-02 PROCEDURE — 84520 ASSAY OF UREA NITROGEN: CPT

## 2018-02-02 PROCEDURE — 82247 BILIRUBIN TOTAL: CPT

## 2018-02-02 PROCEDURE — 83880 ASSAY OF NATRIURETIC PEPTIDE: CPT

## 2018-02-02 PROCEDURE — 82435 ASSAY OF BLOOD CHLORIDE: CPT

## 2018-02-02 PROCEDURE — 93005 ELECTROCARDIOGRAM TRACING: CPT

## 2018-02-02 PROCEDURE — 84132 ASSAY OF SERUM POTASSIUM: CPT

## 2018-02-02 PROCEDURE — 99284 EMERGENCY DEPT VISIT MOD MDM: CPT

## 2018-02-02 PROCEDURE — 82565 ASSAY OF CREATININE: CPT

## 2018-02-02 NOTE — RADIOLOGY IMAGING REPORT
FACILITY: [facility] 

 

PATIENT NAME: Celsa العلي

: 1932

MR: [id number]

V: [id number]

EXAM DATE: 

ORDERING PHYSICIAN: KRISTEN HALE

TECHNOLOGIST: 

 

Location: [facility]

Patient: Celsa العلي

: 1932

MRN: [id number]

Visit/Account:[id number]

Date of Sevice:  2018

 

ACCESSION #: [id number]

 

EXAMINATION:

CT Head without intravenous contrast

 

HISTORY:  Syncope.

 

TECHNIQUE:  Axial images were obtained from the skull base to the vertex without intravenous contrast
.  Sagittal and coronal reformatted images are also submitted.

 

One of the following dose optimization techniques was utilized in the performance of this exam: Autom
ated exposure control; adjustment of the mA and/or kV according to the patient's size; or use of an i
terative  reconstruction technique.  Specific details can be referenced in the facility's radiology C
T exam operational policy.

 

COMPARISON:  2018.

 

FINDINGS:

 

Brain volume:  Mild generalized volume loss.

 

Ventricles:  Negative.

 

Acute ischemic changes:  None.

 

Hemorrhage:  None.

 

Masses / edema:  None.

 

Gray-white: Negative.

 

White matter:  Negative.

 

Vessels:  Calcified plaque of both carotid siphons.

 

Extra-axial:  Negative.

 

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

 

Visualized sinuses / orbits:  Bilateral medial maxillary antrostomy. Otherwise negative.

 

IMPRESSION: No acute intracranial abnormality.

 

Report Dictated By: Sami Mallory MD at 2018 2:38 PM

 

Report E-Signed By: Sami Mallory MD  at 2018 2:41 PM

 

WSN:[id number]

## 2018-02-02 NOTE — RADIOLOGY IMAGING REPORT
FACILITY: [facility] 

 

PATIENT NAME: Celsa العلي

: 1932

MR: [id number]

V: [id number]

EXAM DATE: 

ORDERING PHYSICIAN: KRISTEN HALE

TECHNOLOGIST: 

 

Location: [facility] [place]

Patient: Celsa العلي

: 1932

MRN: [id number]

Visit/Account:[id number]

Date of Sevice:  2018

 

ACCESSION #: [id number]

 

Exam type: CHEST SINGLE AP

 

History: Chest Pain

 

Comparison: 10 or 2018.

 

Findings:

 

Again noted is mild hyperexpansion of the lung fields.  There is no evidence of acute effusions, infi
ltrates or edema.  No evidence of a pneumothorax or pneumomediastinum.  Cardiac silhouette appears no
rmal.  There is a left IJ implanted port the distal tip projects over the superior vena cava.

 

IMPRESSION:

 

1.  Mild hyperexpansion of the lung fields although no acute cardiopulmonary process is seen

 

Report Dictated By: Purnima Ely MD at 2018 1:01 PM

 

Report E-Signed By: Purnima Ely MD  at 2018 1:02 PM

 

WSN:[id number]

## 2018-02-02 NOTE — ER REPORT
History and Physical


Time Seen By MD:  12:37


HPI/ROS


CHIEF COMPLAINT: Chest pain 





HISTORY OF PRESENT ILLNESS: Patient is an 85-year-old female who was supposed 

to see cardiology today for episodes of "hot flashes, near syncopal episodes 

today on the way to the appointment she had an episode again the friend reports 

that she had a probable 5-10 seconds of where she slumped over and may have 

passed out she had rapid return to baseline mental status there is no history 

of shaking or seizure-like activity. She states that the symptoms have been 

happening for "years". She is coming to the emergency department because she 

states that she was RD on her way to her cardiology appointment. Since the 

symptoms were occurring she came to the emergency department instead. At her 

evaluation the emergency department she is completely symptom and complaint 

free. She denies having chest pressure or shortness of breath she denies chest 

pain. Patient is a nonsmoker and she is recovering from uterine cancer.





REVIEW OF SYSTEMS:


Constitutional: No fever, no chills.


Eyes: No discharge.


ENT: No sore throat. 


Cardiovascular: No chest pain, no palpitations. Hot flashes


Respiratory: No cough, no shortness of breath.


Gastrointestinal: No abdominal pain, no vomiting.


Genitourinary: No hematuria.


Musculoskeletal: No back pain.


Skin: No rashes.


Neurological: No headache. Possible syncopal episode


Allergies:  


Coded Allergies:  


     No Known Drug Allergies (Unverified , 17)


Home Meds


Reported Medications


Metoprolol Tartrate (METOPROLOL TARTRATE) 25 Mg Tablet, 1 TAB PO BID, TAB


   18


Apixaban (ELIQUIS) 5 Mg Tablet, 5 MG PO


   18


Metoprolol Tartrate (METOPROLOL TARTRATE) 25 Mg Tablet, 1 TAB PO BID, TAB


   18


Pyridoxine Hcl (VITAMIN B-6) 25 Mg Tablet, 25 MG PO DAILY


   17


Cyanocobalamin (Vitamin B-12) (VITAMIN B-12) 1,000 Mcg Tablet, 1000 MCG PO DAILY


   17


Calcium Carbonate (CALCIUM) 500 Mg Tab.chew, 500 MG PO BID, TAB.CHEW


   16


Discontinued Reported Medications


Metoprolol Tartrate (METOPROLOL TARTRATE) 25 Mg Tablet, 25 MG PO BID, TAB


   17


Apixaban (ELIQUIS) 2.5 Mg Tablet, 2.5 MG PO


   16


Multivitamin (MULTI-VITAMIN DAILY) 1 Each Tablet, 1 EACH PO BID


   16


Discontinued Scripts


Gabapentin (GABAPENTIN) 300 Mg Capsule, 300 MG PO BID, #60 CAPSULE


   Prov:ADAN CHRISTINE MD         16


Past Medical/Surgical History


Oncology History


Patient is 84-year-old female who was diagnosed with stage IIIC-1 in 2014 after she developed abnormal vaginal bleeding. She underwent endometrial 

biopsy on 2014 which was positive for endometrial adenocarcinoma, 

FIGO grade 2-3.  Patient was referred to Dr. Tara Wright and underwent total 

abdominal hysterectomy with bilateral salpingo-oophorectomy with 

lymphadenectomy and pelvic washing done on 2014.  Final pathology 

was positive for endometrioid adenocarcinoma, FIGO grade 3.  The tumor was 5 cm 

with 100% invasion into the myometrium with focal serosal involvement.  

Lymphovascular invasion was positive, but no cervical stromal or parametrial 

involvement.  She was staged as stage IIIC-1 (pT3a pN1).  She received adjuvant 

chemotherapy with carboplatin and Taxol for four cycles, completed on .  She also received external beam radiation therapy and intracavitary 

radiation therapy. Her last  from 2017 was 5. She is considered to be in 

remission at this stage





Medical History


Past Medical History


1.  Uterine cancer. 


2.  Melanoma of the cheek in . 


3.  Pancreatitis in .  


4.  Atrial fibrillation. 


5. 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


Past Surgical History


1.  Radiacl hysterectomy and BSO. 


2.  Mastectomy. 


3.  Tonsillectomy.


Hx Smoking:  No


Smoking Status:  Never Smoker


Exposure to Second Hand Smoke?:  No


Hx Substance Use Disorder:  No


Hx Alcohol Use:  Yes (nando before bedtime)


Constitutional





Vital Sign - Last 24 Hours








 18





 12:33 12:40 12:41 12:48


 


Temp   98.0 


 


Pulse ???  68 69


 


Resp   20 36


 


B/P (MAP)  155/81 (105) 155/81 


 


Pulse Ox   95 94


 


O2 Delivery   Room Air 


 


    





 18





 13:00 13:03 13:18 13:30


 


Pulse  67 70 


 


B/P (MAP) 157/94 (115)   167/112 (130)


 


Pulse Ox  94 95 





 18  





 13:33 13:48  


 


Pulse 71 73  


 


Pulse Ox 95   








Physical Exam


   General Appearance: The patient is alert, has no immediate need for airway 

protection and no signs of toxicity. 


Eyes: Pupils equal and round no pallor or injection.


ENT, Mouth: Mucous membranes are moist.


Respiratory: There are no retractions, lungs are clear to auscultation.


Cardiovascular: Regular rate and rhythm. 


Gastrointestinal:  Abdomen is soft and non tender, no masses, bowel sounds 

normal.


Neuro: Patient is awake alert oriented 3. Cranial nerves II through XII are 

intact and symmetrical 


Skin: Warm and dry, no rashes.


Musculoskeletal:  Neck is supple non tender.


      Extremities are nontender, nonswollen and have full range of motion.





Medical Decision Making


Data Points


Result Diagram:  


18 1310                                                                    

            18 1310





Laboratory





Hematology








Test


  18


13:10


 


Red Blood Count


  4.78 M/uL


(4.17-5.56)


 


Mean Corpuscular Volume


  91.0 fL


(80.0-96.0)


 


Mean Corpuscular Hemoglobin


  30.8 pg


(26.0-33.0)


 


Mean Corpuscular Hemoglobin


Concent 33.9 g/dL


(32.0-36.0)


 


Red Cell Distribution Width


  14.3 %


(11.5-14.5)


 


Mean Platelet Volume


  7.1 fL


(7.2-11.1)


 


Neutrophils (%) (Auto)


  71.3 %


(39.4-72.5)


 


Lymphocytes (%) (Auto)


  16.5 %


(17.6-49.6)


 


Monocytes (%) (Auto)


  9.8 %


(4.1-12.4)


 


Eosinophils (%) (Auto)


  1.7 %


(0.4-6.7)


 


Basophils (%) (Auto)


  0.7 %


(0.3-1.4)


 


Nucleated RBC Relative Count


(auto) 0.0 /100WBC 


 


 


Neutrophils # (Auto)


  4.1 K/uL


(2.0-7.4)


 


Lymphocytes # (Auto)


  0.9 K/uL


(1.3-3.6)


 


Monocytes # (Auto)


  0.6 K/uL


(0.3-1.0)


 


Eosinophils # (Auto)


  0.1 K/uL


(0.0-0.5)


 


Basophils # (Auto)


  0.0 K/uL


(0.0-0.1)


 


Nucleated RBC Absolute Count


(auto) 0.00 K/uL 


 


 


Prothrombin Time


  15.6 seconds


(12.0-14.4)


 


Prothromb Time International


Ratio 1.23 


 


 


Activated Partial


Thromboplast Time 51 seconds


(23-35)


 


Sodium Level


  140 mmol/L


(137-145)


 


Potassium Level


  3.7 mmol/L


(3.5-5.0)


 


Chloride Level


  103 mmol/L


()


 


Carbon Dioxide Level


  24 mmol/L


(22-31)


 


Blood Urea Nitrogen


  17 mg/dl


(7-18)


 


Creatinine


  1.00 mg/dl


(0.52-1.04)


 


Glomerular Filtration Rate


Calc 52.7 


 


 


Random Glucose


  83 mg/dl


()


 


Calcium Level


  9.4 mg/dl


(8.4-10.2)


 


Total Bilirubin


  0.7 mg/dl


(0.2-1.3)


 


Aspartate Amino Transf


(AST/SGOT) 19 U/L (0-35) 


 


 


Alanine Aminotransferase


(ALT/SGPT) 27 U/L (0-56) 


 


 


Alkaline Phosphatase 81 U/L (0-126) 


 


Troponin I < 0.012 ng/ml 


 


B-Type Natriuretic Peptide


  486 pg/ml


(0-100)


 


Total Protein


  6.6 gm/dl


(6.3-8.2)


 


Albumin


  3.8 g/dl


(3.5-5.0)








Chemistry








Test


  18


13:10


 


White Blood Count


  5.7 k/uL


(4.5-11.0)


 


Red Blood Count


  4.78 M/uL


(4.17-5.56)


 


Hemoglobin


  14.7 g/dL


(12.0-16.0)


 


Hematocrit


  43.5 %


(34.0-47.0)


 


Mean Corpuscular Volume


  91.0 fL


(80.0-96.0)


 


Mean Corpuscular Hemoglobin


  30.8 pg


(26.0-33.0)


 


Mean Corpuscular Hemoglobin


Concent 33.9 g/dL


(32.0-36.0)


 


Red Cell Distribution Width


  14.3 %


(11.5-14.5)


 


Platelet Count


  266 K/uL


(150-450)


 


Mean Platelet Volume


  7.1 fL


(7.2-11.1)


 


Neutrophils (%) (Auto)


  71.3 %


(39.4-72.5)


 


Lymphocytes (%) (Auto)


  16.5 %


(17.6-49.6)


 


Monocytes (%) (Auto)


  9.8 %


(4.1-12.4)


 


Eosinophils (%) (Auto)


  1.7 %


(0.4-6.7)


 


Basophils (%) (Auto)


  0.7 %


(0.3-1.4)


 


Nucleated RBC Relative Count


(auto) 0.0 /100WBC 


 


 


Neutrophils # (Auto)


  4.1 K/uL


(2.0-7.4)


 


Lymphocytes # (Auto)


  0.9 K/uL


(1.3-3.6)


 


Monocytes # (Auto)


  0.6 K/uL


(0.3-1.0)


 


Eosinophils # (Auto)


  0.1 K/uL


(0.0-0.5)


 


Basophils # (Auto)


  0.0 K/uL


(0.0-0.1)


 


Nucleated RBC Absolute Count


(auto) 0.00 K/uL 


 


 


Prothrombin Time


  15.6 seconds


(12.0-14.4)


 


Prothromb Time International


Ratio 1.23 


 


 


Activated Partial


Thromboplast Time 51 seconds


(23-35)


 


Glomerular Filtration Rate


Calc 52.7 


 


 


Calcium Level


  9.4 mg/dl


(8.4-10.2)


 


Total Bilirubin


  0.7 mg/dl


(0.2-1.3)


 


Aspartate Amino Transf


(AST/SGOT) 19 U/L (0-35) 


 


 


Alanine Aminotransferase


(ALT/SGPT) 27 U/L (0-56) 


 


 


Alkaline Phosphatase 81 U/L (0-126) 


 


Troponin I < 0.012 ng/ml 


 


B-Type Natriuretic Peptide


  486 pg/ml


(0-100)


 


Total Protein


  6.6 gm/dl


(6.3-8.2)


 


Albumin


  3.8 g/dl


(3.5-5.0)








Coagulation








Test


  18


13:10


 


Prothrombin Time 15.6 seconds 


 


Prothromb Time International


Ratio 1.23 


 


 


Activated Partial


Thromboplast Time 51 seconds 


 











EKG/Imaging


EKG Interpretation


EKG shows normal sinus rhythm with nonspecific ST segment abnormality of 

ventricular rate of 67 bpm. This was compared to an EKG from 2018 which 

is essentially unchanged.


Monitor Interpretation:  Normal Sinus Rhythm


Imaging


FACILITY: [facility] 


 


PATIENT NAME: Celsa العلي


: 1932


MR: [id number]


V: [id number]


EXAM DATE: 


ORDERING PHYSICIAN: KRISTEN HALE


TECHNOLOGIST: 


 


Location: [facility]


Patient: Celsa العلي


: 1932


MRN: [id number]


Visit/Account:[id number]


Date of Sevice:  2018


 


ACCESSION #: [id number]


 


Exam type: CHEST SINGLE AP


 


History: Chest Pain


 


Comparison: 10 or 12 2018.


 


Findings:


 


Again noted is mild hyperexpansion of the lung fields.  There is no evidence of 

acute effusions, infiltrates or edema.  No evidence of a pneumothorax or 

pneumomediastinum.  Cardiac silhouette appears normal.  There is a left IJ 

implanted port the distal tip projects over the superior vena cava.


 


IMPRESSION:


 


1.  Mild hyperexpansion of the lung fields although no acute cardiopulmonary 

process is seen


 


Report Dictated By: Purnima Ely MD at 2018 1:01 PM


 


Report E-Signed By: Purnima Ely MD  at 2018 1:02 PM


 


WSN:[email or web address]





FACILITY: [facility] 


 


PATIENT NAME: Celsa العلي


: 1932


MR: [id number]


V: [id number]


EXAM DATE: [id number]


ORDERING PHYSICIAN: KRISTEN HALE


TECHNOLOGIST: 


 


Location: [facility]


Patient: Celsa العلي


: 1932


MRN: [id number]


Visit/Account:[id number]


Date of Sevice:  2018


 


ACCESSION #: [id number]


 


EXAMINATION:


CT Head without intravenous contrast


 


HISTORY:  Syncope.


 


TECHNIQUE:  Axial images were obtained from the skull base to the vertex 

without intravenous contrast.  Sagittal and coronal reformatted images are also 

submitted.


 


One of the following dose optimization techniques was utilized in the 

performance of this exam: Automated exposure control; adjustment of the mA and/

or kV according to the patient's size; or use of an iterative  reconstruction 

technique.  Specific details can be referenced in the facility's radiology CT 

exam operational policy.


 


COMPARISON:  2018.


 


FINDINGS:


 


Brain volume:  Mild generalized volume loss.


 


Ventricles:  Negative.


 


Acute ischemic changes:  None.


 


Hemorrhage:  None.


 


Masses / edema:  None.


 


Gray-white: Negative.


 


White matter:  Negative.


 


Vessels:  Calcified plaque of both carotid siphons.


 


Extra-axial:  Negative.


 


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


 


Visualized sinuses / orbits:  Bilateral medial maxillary antrostomy. Otherwise 

negative.


 


IMPRESSION: No acute intracranial abnormality.


 


Report Dictated By: Sami Mallory MD at 2018 2:38 PM


 


Report E-Signed By: Sami Mallory MD  at 2018 2:41 PM


 


WSN:[id number]





ED Course/Re-evaluation


ED Course


 2018 2:39:06 pm patient is a 85-year-old female who had a near syncopal 

versus a short syncopal episode today. Patient has been having these for the 

past years which is why she is being evaluated by cardiology. At this time will 

be cardiac workup with EKG and troponin. I will also do CT imaging of the brain 

given the history of the patient's prior history of uterine cancer. She is 

currently symptom-free we'll continue to monitor in the emergency department 

for recurrent event.


Decision to Disposition Date:  2018


Decision to Disposition Time:  14:54





Depart


Departure


Latest Vital Signs





Vital Signs








  Date Time  Temp Pulse Resp B/P (MAP) Pulse Ox O2 Delivery O2 Flow Rate FiO2


 


18 13:48  73      


 


18 13:33     95   


 


18 13:30    167/112 (130)    


 


18 12:48   36     


 


18 12:41 98.0     Room Air  








Impression:  


 Primary Impression:  


 Flushing reaction


 Additional Impression:  


 Near syncope


Condition:  Improved


Disposition:  HOME OR SELF-CARE


Referrals:  


CANDIS CANALES (PCP)


1 Week


for follow up of near syncopal episode


Patient Instructions:  Near Syncope (ED)





Additional Instructions:  


It is recommended that you rescheduling her appointment with cardiology as soon 

as possible for further evaluation





Problem Qualifiers











KRISTEN HALE MD 2018 12:37

## 2018-02-03 NOTE — EKG
[place] [facility] [place] 

 

PATIENT NAME: JOEY NORTH

: [id number]

MR: [id number]

V: [id number]

EXAM DATE: 

ORDERING PHYSICIAN: JOSE BRAVO

TECHNOLOGIST: BITNER

 

Test Reason : AFB

Blood Pressure : ***/*** mmHG

Vent. Rate : 067 BPM     Atrial Rate : 067 BPM

   P-R Int : 144 ms          QRS Dur : 074 ms

    QT Int : 408 ms       P-R-T Axes : -28 052 049 degrees

   QTc Int : 431 ms

 

Normal sinus rhythm

Nonspecific ST abnormality

Abnormal ECG

No previous ECGs available

Confirmed by МАРИЯ COPELAND ([id number]) on 2/3/2018 8:55:54 PM

 

Referred By:             Confirmed By:МАРИЯ COPELAND

## 2018-03-01 VITALS — SYSTOLIC BLOOD PRESSURE: 146 MMHG | DIASTOLIC BLOOD PRESSURE: 66 MMHG

## 2018-04-12 ENCOUNTER — HOSPITAL ENCOUNTER (OUTPATIENT)
Dept: HOSPITAL 89 - SPU | Age: 83
End: 2018-04-12
Attending: INTERNAL MEDICINE
Payer: MEDICARE

## 2018-04-12 ENCOUNTER — HOSPITAL ENCOUNTER (OUTPATIENT)
Dept: HOSPITAL 89 - US | Age: 83
End: 2018-04-12
Attending: INTERNAL MEDICINE
Payer: MEDICARE

## 2018-04-12 ENCOUNTER — HOSPITAL ENCOUNTER (OUTPATIENT)
Dept: HOSPITAL 89 - SPU | Age: 83
LOS: 8 days | Discharge: HOME | End: 2018-04-20
Attending: INTERNAL MEDICINE
Payer: MEDICARE

## 2018-04-12 VITALS — BODY MASS INDEX: 24.96 KG/M2 | WEIGHT: 147.71 LBS

## 2018-04-12 VITALS — SYSTOLIC BLOOD PRESSURE: 176 MMHG | DIASTOLIC BLOOD PRESSURE: 96 MMHG

## 2018-04-12 VITALS — BODY MASS INDEX: 24.96 KG/M2 | BODY MASS INDEX: 24.96 KG/M2

## 2018-04-12 DIAGNOSIS — R53.1: ICD-10-CM

## 2018-04-12 DIAGNOSIS — Z92.21: ICD-10-CM

## 2018-04-12 DIAGNOSIS — K52.0: ICD-10-CM

## 2018-04-12 DIAGNOSIS — R53.83: ICD-10-CM

## 2018-04-12 DIAGNOSIS — Z85.42: ICD-10-CM

## 2018-04-12 DIAGNOSIS — I07.1: ICD-10-CM

## 2018-04-12 DIAGNOSIS — Z92.3: ICD-10-CM

## 2018-04-12 DIAGNOSIS — G62.0: ICD-10-CM

## 2018-04-12 DIAGNOSIS — I48.91: Primary | ICD-10-CM

## 2018-04-12 DIAGNOSIS — M85.80: Primary | ICD-10-CM

## 2018-04-12 DIAGNOSIS — R10.13: ICD-10-CM

## 2018-04-12 DIAGNOSIS — I25.10: ICD-10-CM

## 2018-04-12 DIAGNOSIS — I48.0: Primary | ICD-10-CM

## 2018-04-12 LAB
LDLC SERPL-MCNC: 102 MG/DL
PLATELET COUNT, AUTOMATED: 254 K/UL (ref 150–450)

## 2018-04-12 PROCEDURE — 96372 THER/PROPH/DIAG INJ SC/IM: CPT

## 2018-04-12 PROCEDURE — 82947 ASSAY GLUCOSE BLOOD QUANT: CPT

## 2018-04-12 PROCEDURE — 36591 DRAW BLOOD OFF VENOUS DEVICE: CPT

## 2018-04-12 PROCEDURE — 84450 TRANSFERASE (AST) (SGOT): CPT

## 2018-04-12 PROCEDURE — 82247 BILIRUBIN TOTAL: CPT

## 2018-04-12 PROCEDURE — 82565 ASSAY OF CREATININE: CPT

## 2018-04-12 PROCEDURE — 84075 ASSAY ALKALINE PHOSPHATASE: CPT

## 2018-04-12 PROCEDURE — 83690 ASSAY OF LIPASE: CPT

## 2018-04-12 PROCEDURE — 84443 ASSAY THYROID STIM HORMONE: CPT

## 2018-04-12 PROCEDURE — 82150 ASSAY OF AMYLASE: CPT

## 2018-04-12 PROCEDURE — 85025 COMPLETE CBC W/AUTO DIFF WBC: CPT

## 2018-04-12 PROCEDURE — 84520 ASSAY OF UREA NITROGEN: CPT

## 2018-04-12 PROCEDURE — 83718 ASSAY OF LIPOPROTEIN: CPT

## 2018-04-12 PROCEDURE — 84132 ASSAY OF SERUM POTASSIUM: CPT

## 2018-04-12 PROCEDURE — 82435 ASSAY OF BLOOD CHLORIDE: CPT

## 2018-04-12 PROCEDURE — 82310 ASSAY OF CALCIUM: CPT

## 2018-04-12 PROCEDURE — 82374 ASSAY BLOOD CARBON DIOXIDE: CPT

## 2018-04-12 PROCEDURE — 86304 IMMUNOASSAY TUMOR CA 125: CPT

## 2018-04-12 PROCEDURE — 93306 TTE W/DOPPLER COMPLETE: CPT

## 2018-04-12 PROCEDURE — 84295 ASSAY OF SERUM SODIUM: CPT

## 2018-04-12 PROCEDURE — 82040 ASSAY OF SERUM ALBUMIN: CPT

## 2018-04-12 PROCEDURE — 99212 OFFICE O/P EST SF 10 MIN: CPT

## 2018-04-12 PROCEDURE — 84478 ASSAY OF TRIGLYCERIDES: CPT

## 2018-04-12 PROCEDURE — 84460 ALANINE AMINO (ALT) (SGPT): CPT

## 2018-04-12 PROCEDURE — 84155 ASSAY OF PROTEIN SERUM: CPT

## 2018-04-12 PROCEDURE — 82465 ASSAY BLD/SERUM CHOLESTEROL: CPT

## 2018-04-12 NOTE — ONCOLOGY FOLLOW UP NOTE
EVENT DATE:  April 12, 2018 



DIAGNOSES

1.  Stage IIIC-1 (pT3a pN1c M0) endometrial adenocarcinoma.  

2.  Chemotherapy induced sensory neuropathy.  

3.  Radiation induced colitis with diarrhea.  



CHIEF COMPLAINT

The patient is here today for followup of her endometrial carcinoma.  



ONCOLOGY HISTORY

Patient is 84-year-old female who was diagnosed with stage IIIC-1 in August of 2014 after she developed abnormal vaginal bleeding and underwent endometrial 
biopsy done on August 5, 2014 which was positive for endometrial adenocarcinoma
, FIGO grade 2-3.  Patient was referred to Dr. Tara Wright and underwent 
total abdominal hysterectomy with bilateral salpingo-oophorectomy with 
lymphadenectomy and pelvic washing done on August 20, 2014.  Final pathology 
was positive for endometrioid adenocarcinoma, FIGO grade 3.  The tumor was 5 cm 
with 100% invasion into the myometrium with focal serosal involvement.  
Lymphovascular invasion was positive, but no cervical stromal or parametrial 
involvement.  She was staged as stage IIIC-1 (pT3a pN1).  She received adjuvant 
chemotherapy with carboplatin and Taxol for four cycles, completed on January 8
, 2015.  She also received radiation therapy with external beam radiation 
therapy and intracavitary radiation therapy.  Her tumor marker currently is 8, 
which is very, very good.



HISTORY OF PRESENT ILLNESS

Patient is here today for followup of her endometrial carcinoma.  She is doing 
fine currently except for tingling and numbness in the hands and feet from 
neuropathy.  She is also weak, tired and fatigued all the time.  She is 
complaining of epigastric pain since her cardiologist changed her cardiac 
medication, but denies any nausea, vomiting or back pain or diarrhea or 
constipation.  



PAST MEDICAL HISTORY

1.  Uterine cancer. 

2.  Melanoma of the cheek in 2006. 

3.  Pancreatitis in 2013.  

4.  Atrial fibrillation. 



PAST SURGICAL HISTORY

1.  Hysterectomy. 

2.  Mastectomy. 

3.  Tonsillectomy. 



SOCIAL HISTORY

Patient never smoked.  She reports she does not drink alcohol or use illicit 
drugs.  



FAMILY HISTORY

Cancer in the brother and mother. 



MEDICATIONS

1.  Fosamax 70 mg weekly.  

2.  Alprazolam 0.5 mg at night one-half p.r.n. 

3.  Eliquis 5 mg tablet twice daily.  

4.  Coreg 25 mg tablet two times daily. 

5.  Neurontin 600 mg twice daily. 

6.  Lomotil p.r.n. for diarrhea. 



ALLERGIES

No known drug allergies. 



REVIEW OF SYSTEMS

CONSTITUTIONAL:  No appetite or weight change.  No fever, chills or sweating.  
No recent infection.    

HEENT:  

Ears:  No tinnitus or hearing problem.  

Nose:  No nasal discharge or epistaxis.  

Throat:  No sore throat or mouth ulcers.  

Eyes:  No diplopia or visual changes.  

RESPIRATORY:  No shortness of breath.  No cough, expectoration or hemoptysis.  
  

CARDIOVASCULAR:  She has swelling of the right lower extremity.  

GASTROINTESTINAL:  No nausea or vomiting.  No diarrhea or constipation.  No 
change in bowel movements.  No heartburn or swallowing difficulties.  She has 
epigastric pain.  No jaundice.  No hematemesis, melena or rectal bleeding.     
   

GENITOURINARY:  The patient has increased frequency of urine.  

MUSCULOSKELETAL:  She has muscle aches after working in her backyard yesterday.

NEUROLOGICAL:  She has tingling and numbness in her hands and feet from 
neuropathy.   

HEMATOLOGIC/LYMPHATIC:  No bleeding or easy bruising.  She is weak, tired and 
fatigued.  No enlarged lymph nodes.

SKIN:  No skin rash or lumps.  

PSYCHIATRIC:  No anxiety or depression.  



PHYSICAL EXAMINATION

GENERAL:  Looks stable.  Well-developed, well-nourished, and in no acute 
distress. 

VITAL SIGNS: Blood pressure 176/96, pulse 82 per minute, respirations 16 per 
minute, temperature 97.8, pulse ox 92% on room air.

HEENT:  

Head:  Atraumatic.  No sinus tenderness to palpation.  

Eyes:  No icterus or conjunctivitis.  

Mouth and throat:  No oral thrush or mucositis.    

NECK:  Supple.  No cervical or supraclavicular lymphadenopathy.  

LUNGS:  Clear to auscultation and percussion bilaterally.      

HEART:  Regular rate and rhythm.  No gallops, murmurs, clicks or rubs.

ABDOMEN:  Soft and lax.  There is mild tenderness over the epigastrium, but no 
guarding or rebound tenderness.  No hepatosplenomegaly.  No masses.     

EXTREMITIES:  There is some pitting edema of the right lower extremity.    

LYMPHATICS:  No peripheral lymphadenopathy.   

NEUROLOGICAL:  Conscious, alert and oriented times three.  No focal motor or 
sensory deficits.  

PSYCHIATRIC:  Mood and affect appear normal.  

SKIN:  No skin rash, bruise or purpuric eruption.



DIAGNOSTIC DATA

Still pending.  



ASSESSMENT

1.  Stage IIIC (pT3a pN1c M0) endometrial adenocarcinoma, FIGO grade 3 with 100
% invasion of the myometrium.  Patient received adjuvant chemotherapy with 
carboplatin and Taxol for six cycles, completed January 8, 2015.  She received 
also adjuvant radiation therapy with external beam radiation therapy and 
intracavitary seed implant.  She is currently in remission.  Her CA-125 was 
normal and level for today is pending.  I am planning to continue followup in 
four months with CBC, chem panel and CA-125.  

2.  Epigastric pain.  Patient thought this is the same pain she had when she 
had her pancreatitis.  I am planning to add amylase and lipase level to her 
blood work today.  

3.  Chemotherapy-induced neuropathy which is stable, involving the hands and 
feet.  

4.  History of atrial fibrillation.

5.  History of melanoma of the cheek in 2006.



PLAN

1.  Continue followup.

2.  Patient to return in four months with CBC, chem panel, CA-125.

3.  Continue gabapentin one capsule three times daily.

4.  Add amylase and lipase to her blood work today. 

5.  Patient is to contact us for any new concerns or complaints.



[facility]

## 2018-04-13 NOTE — RADIOLOGY IMAGING REPORT
FACILITY: [facility] 

 

PATIENT NAME: JOEY NORTH

: [id number]

MR: [id number]

V: [id number]

EXAM DATE: 

ORDERING PHYSICIAN: ALEXANDRU CHAVIRA

TECHNOLOGIST: Jasper Morales

 

EXAMINATION:TWO-DIMENSIONAL ECHOCARDIOGRAPH

REASON:PAROXYSMAL ATRIAL FIBRILLATION

 

2D Measurements (normal values in centimeters)

LV endLV endRV endVent.LV PostAorticLeftPercent

DiastolicSystolicDiastolicSeptumWallRootAtriumShortening

(3.5-5.7)(0.9-2.6)(0.6-1.1)(0.6-1.1)(2.0-3.7)(1.9-4.0)(25-35%)

[id number]%

 

STROKE VOLUME:  51ml

ESTIMATED EJECTION FRACTION:70%

 

PARASTERNAL LONG AXIS:

Overall left ventricular systolic function appears to be normal. 

Chamber sizes are normal with the right ventricle being the upper range 

of normal in size. The patient is in atrial fibrillation. No thrombi 

are noted & the left atrial appendage is not seen. Color examination 

of the valves reveals a trace of mitral insufficiency & some 

tricuspid insufficiency. Color examination of the aortic valve was 

unremarkable. The aortic valve appears to open normally. 

 

PARASTERNAL SHORT AXIS: 

Again overall left ventricular function appears to be normal. Patient 

is in atrial fibrillation. Right ventricle is the upper range of normal 

in size. The aortic valve is trileaflet in configuration & appears to 

open normally. There is mild aortic sclerosis present. Color 

examination of the pulmonic valve was unremarkable. Color examination 

of the tricuspid valve reveals a trace to mild amount of tricuspid 

insufficiency.

 

APICAL FOUR AND TWO CHAMBER: 

Again normal left ventricular ejection fraction. Normal chamber sizes. 

The aortic valve area was measured within normal ranges at 2.3cm2. 

Mitral valve appears to open normally. There is some mild mitral 

annular calcification. Left atrial volume measured within normal 

ranges. Right ventricular function TAPSE was measured at 1.8 which is 

within normal ranges. Color examination of the mitral valve reveals a 

trace of mitral insufficiency. Color examination of the tricuspid valve 

reveals a trace to mild amount of tricuspid insufficiency. The 

tricuspid regurgitation Vmax measured 2.7m/sec. 

 

SUBCOSTAL VIEW: 

No pericardial effusion was noted. No atrioseptal or ventriculoseptal 

defects were appreciated. Strain measurements were all measured within 

normal ranges except for some decrease in inferolateral & inferior 

wall Strain at the basal levels.

 

 

 

 

 

OVERALL IMPRESSION: 

 

1. Normal left ventricular ejection fraction of approximately 70%. We 

were unable to accurately estimate the diastolic function as the 

patient is in atrial fibrillation. No thrombi were noted in any of the 

chambers but the left atrial appendage was not seen.

2. Normal chamber sizes with the right ventricle being the upper range 

of normal in size. Right ventricle appears to contract normally.

3. A trileaflet aortic valve with mild aortic sclerosis but no 

stenosis.

4. Mild mitral annular calcification with a trace to mild amount of 

mitral insufficiency.

5. Trace to mild amount of tricuspid insufficiency with estimated right 

ventricular systolic pressures within normal ranges at 32mm Hg.

6. In comparison to the examination done on 2012, no specific 

change except that the right ventricular systolic pressures & 

pulmonary pressures have decreased from 58mm Hg to 32mm Hg.

 

 

 

 

 

 

 

 

 

 

 

Dictated by: TOVA Reyes M.D. on 2018 at 14:53   

Transcribed by: DEBORAH on 2018 at 16:05    

Approved by: TOVA Reyes M.D. on 2018 at 9:14   

[facility]

## 2018-05-03 ENCOUNTER — HOSPITAL ENCOUNTER (EMERGENCY)
Dept: HOSPITAL 89 - ER | Age: 83
Discharge: HOME | End: 2018-05-03
Payer: MEDICARE

## 2018-05-03 ENCOUNTER — HOSPITAL ENCOUNTER (OUTPATIENT)
Dept: HOSPITAL 89 - AMB | Age: 83
End: 2018-05-03
Payer: MEDICARE

## 2018-05-03 VITALS — SYSTOLIC BLOOD PRESSURE: 135 MMHG | DIASTOLIC BLOOD PRESSURE: 78 MMHG

## 2018-05-03 VITALS — BODY MASS INDEX: 24.96 KG/M2
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
## 2018-05-03 DIAGNOSIS — I48.2: ICD-10-CM
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
## 2018-05-03 DIAGNOSIS — Z79.01: ICD-10-CM

## 2018-05-03 DIAGNOSIS — R41.3: ICD-10-CM

## 2018-05-03 DIAGNOSIS — R51: Primary | ICD-10-CM

## 2018-05-03 DIAGNOSIS — S09.90XA: Primary | ICD-10-CM

## 2018-05-03 LAB
INR PPP: 1.14
PLATELET COUNT, AUTOMATED: 240 K/UL (ref 150–450)

## 2018-05-03 PROCEDURE — 84075 ASSAY ALKALINE PHOSPHATASE: CPT

## 2018-05-03 PROCEDURE — 85025 COMPLETE CBC W/AUTO DIFF WBC: CPT

## 2018-05-03 PROCEDURE — 82565 ASSAY OF CREATININE: CPT

## 2018-05-03 PROCEDURE — 82247 BILIRUBIN TOTAL: CPT

## 2018-05-03 PROCEDURE — 84155 ASSAY OF PROTEIN SERUM: CPT

## 2018-05-03 PROCEDURE — 82040 ASSAY OF SERUM ALBUMIN: CPT

## 2018-05-03 PROCEDURE — 82435 ASSAY OF BLOOD CHLORIDE: CPT

## 2018-05-03 PROCEDURE — 85730 THROMBOPLASTIN TIME PARTIAL: CPT

## 2018-05-03 PROCEDURE — 84295 ASSAY OF SERUM SODIUM: CPT

## 2018-05-03 PROCEDURE — 84132 ASSAY OF SERUM POTASSIUM: CPT

## 2018-05-03 PROCEDURE — 84460 ALANINE AMINO (ALT) (SGPT): CPT

## 2018-05-03 PROCEDURE — 93005 ELECTROCARDIOGRAM TRACING: CPT

## 2018-05-03 PROCEDURE — 72125 CT NECK SPINE W/O DYE: CPT

## 2018-05-03 PROCEDURE — 85610 PROTHROMBIN TIME: CPT

## 2018-05-03 PROCEDURE — 84520 ASSAY OF UREA NITROGEN: CPT

## 2018-05-03 PROCEDURE — 99283 EMERGENCY DEPT VISIT LOW MDM: CPT

## 2018-05-03 PROCEDURE — 71045 X-RAY EXAM CHEST 1 VIEW: CPT

## 2018-05-03 PROCEDURE — 82310 ASSAY OF CALCIUM: CPT

## 2018-05-03 PROCEDURE — 82374 ASSAY BLOOD CARBON DIOXIDE: CPT

## 2018-05-03 PROCEDURE — 70450 CT HEAD/BRAIN W/O DYE: CPT

## 2018-05-03 PROCEDURE — 84450 TRANSFERASE (AST) (SGOT): CPT

## 2018-05-03 PROCEDURE — 82947 ASSAY GLUCOSE BLOOD QUANT: CPT

## 2018-05-03 PROCEDURE — 84484 ASSAY OF TROPONIN QUANT: CPT

## 2018-05-03 NOTE — ER REPORT
History and Physical


Time Seen By MD:  14:50


Hx. of Stated Complaint:  


patient had a fall outside of an eating establishment. she does not remember 

the 


fall and laid on the ground for about 10 minutes


HPI/ROS


CHIEF COMPLAINT: Fall, head injury





HISTORY OF PRESENT ILLNESS: Patient is a 85-year-old female accompanied by EMS 

who presents to ED with complaint of fall and head injury. She states that she 

has been told she will and states that she woke up under her friend's car. She 

states that she was eating at [employer] and they're walking out to get into the 

car but does not recall falling. Her friend did come to the ER and states that 

she was holding onto her but she fell quickly when they stepped off a curb and 

the patient had LOC for about 30 seconds. She denies any nausea or vomiting. 

She was complaining of some neck pain initially and was placed in a c-collar by 

EMS. She apparently at one time did complain of some chest pain but states that 

she is not having any chest pain at the moment. She does have a history of 

atrial fibrillation and is on Eliquis.  Patient only states that the back of 

her head is painful.





REVIEW OF SYSTEMS:


Constitutional: No fever, no chills.


Eyes: No discharge.


ENT: No sore throat. 


Cardiovascular: No chest pain, no palpitations.


Respiratory: No cough, no shortness of breath.


Gastrointestinal: No abdominal pain, no vomiting.


Genitourinary: No hematuria.


Musculoskeletal: No back pain.


Skin: No rashes.


Neurological: See history of present illness.


Allergies:  


Coded Allergies:  


     No Known Drug Allergies (Unverified , 8/11/17)


Home Meds


Reported Medications


Gabapentin (GABAPENTIN) 300 Mg Capsule, 600 MG PO BID, CAPSULE


   4/12/18


Amiodarone HCl/D5w (Amiodarone 150 mg/100 ml-D5w) 150 Mg/100 Ml (1.5 Mg/Ml) 

Plast..bag, 200 MG PO BID


   4/12/18


Apixaban (ELIQUIS) 5 Mg Tablet, 5 MG PO


   2/2/18


Pyridoxine Hcl (VITAMIN B-6) 25 Mg Tablet, 25 MG PO DAILY


   11/30/17


Cyanocobalamin (Vitamin B-12) (VITAMIN B-12) 1,000 Mcg Tablet, 1000 MCG PO DAILY


   11/30/17


Calcium Carbonate (CALCIUM) 500 Mg Tab.chew, 500 MG PO BID, TAB.CHEW


   11/24/16


Reviewed Nurses Notes:  Yes


Old Medical Records Reviewed:  Yes


Hx Smoking:  No


Smoking Status:  Never Smoker


Exposure to Second Hand Smoke?:  No


Hx Substance Use Disorder:  No


Hx Alcohol Use:  Yes (nando before bedtime)


Constitutional





Vital Sign - Last 24 Hours








 5/3/18 5/3/18 5/3/18 5/3/18





 14:49 14:52 15:00 15:02


 


Temp 97.7   


 


Pulse    ???


 


Resp 20   


 


B/P (MAP) 185/120 185/120 (141) 182/120 (140) 


 


Pulse Ox 95   95


 


O2 Delivery Room Air   


 


    





 5/3/18 5/3/18 5/3/18 5/3/18





 15:17 15:47 16:00 16:02


 


Pulse 79 75  72


 


B/P (MAP)   135/78 (97) 


 


Pulse Ox 94 94  92








Physical Exam


   General Appearance: The patient is alert, has no immediate need for airway 

protection and no signs of toxicity. Patient appears to be in no acute distress.


Eyes: Pupils equal and round no pallor or injection. EOMs are full bilaterally.


ENT, Mouth: Mucous membranes are moist.


Respiratory: There are no retractions, lungs are clear to auscultation.


Cardiovascular: Regular rate and rhythm. 


Gastrointestinal:  Abdomen is soft and non tender, no masses, bowel sounds 

normal.


Neurological: Cranial nerves II-12 intact.


Skin: There is a small abrasion on the right scalp but no active bleeding.


Musculoskeletal:  Neck is supple non tender. C-collar is in place.


      Extremities are nontender, nonswollen and have full range of motion.





DIFFERENTIAL DIAGNOSIS: After history and physical exam differential diagnosis 

was considered for head injury including but not limited to concussion, skull 

fracture, intraparenchymal contusion, subarachnoid, subdural and epidural 

hematoma.





Medical Decision Making


Data Points


Result Diagram:  


5/3/18 1522                                                                    

            5/3/18 1522





Laboratory





Hematology








Test


  5/3/18


15:22


 


Red Blood Count


  5.07 M/uL


(4.17-5.56)


 


Mean Corpuscular Volume


  91.8 fL


(80.0-96.0)


 


Mean Corpuscular Hemoglobin


  31.4 pg


(26.0-33.0)


 


Mean Corpuscular Hemoglobin


Concent 34.2 g/dL


(32.0-36.0)


 


Red Cell Distribution Width


  15.2 %


(11.5-14.5)


 


Mean Platelet Volume


  6.8 fL


(7.2-11.1)


 


Neutrophils (%) (Auto)


  74.1 %


(39.4-72.5)


 


Lymphocytes (%) (Auto)


  15.8 %


(17.6-49.6)


 


Monocytes (%) (Auto)


  7.2 %


(4.1-12.4)


 


Eosinophils (%) (Auto)


  1.9 %


(0.4-6.7)


 


Basophils (%) (Auto)


  1.0 %


(0.3-1.4)


 


Nucleated RBC Relative Count


(auto) 0.0 /100WBC 


 


 


Neutrophils # (Auto)


  4.0 K/uL


(2.0-7.4)


 


Lymphocytes # (Auto)


  0.8 K/uL


(1.3-3.6)


 


Monocytes # (Auto)


  0.4 K/uL


(0.3-1.0)


 


Eosinophils # (Auto)


  0.1 K/uL


(0.0-0.5)


 


Basophils # (Auto)


  0.1 K/uL


(0.0-0.1)


 


Nucleated RBC Absolute Count


(auto) 0.00 K/uL 


 


 


Prothrombin Time


  14.6 seconds


(12.0-14.4)


 


Prothromb Time International


Ratio 1.14 


 


 


Activated Partial


Thromboplast Time 36 seconds


(23-35)


 


Sodium Level


  142 mmol/L


(137-145)


 


Potassium Level


  3.7 mmol/L


(3.5-5.0)


 


Chloride Level


  105 mmol/L


()


 


Carbon Dioxide Level


  24 mmol/L


(22-31)


 


Blood Urea Nitrogen


  14 mg/dl


(7-18)


 


Creatinine


  1.00 mg/dl


(0.52-1.04)


 


Glomerular Filtration Rate


Calc 52.7 


 


 


Random Glucose


  117 mg/dl


()


 


Calcium Level


  9.8 mg/dl


(8.4-10.2)


 


Total Bilirubin


  0.6 mg/dl


(0.2-1.3)


 


Aspartate Amino Transf


(AST/SGOT) 23 U/L (0-35) 


 


 


Alanine Aminotransferase


(ALT/SGPT) 19 U/L (0-56) 


 


 


Alkaline Phosphatase 71 U/L (0-126) 


 


Troponin I < 0.012 ng/ml 


 


Total Protein


  7.0 gm/dl


(6.3-8.2)


 


Albumin


  4.1 g/dl


(3.5-5.0)








Chemistry








Test


  5/3/18


15:22


 


White Blood Count


  5.3 k/uL


(4.5-11.0)


 


Red Blood Count


  5.07 M/uL


(4.17-5.56)


 


Hemoglobin


  15.9 g/dL


(12.0-16.0)


 


Hematocrit


  46.5 %


(34.0-47.0)


 


Mean Corpuscular Volume


  91.8 fL


(80.0-96.0)


 


Mean Corpuscular Hemoglobin


  31.4 pg


(26.0-33.0)


 


Mean Corpuscular Hemoglobin


Concent 34.2 g/dL


(32.0-36.0)


 


Red Cell Distribution Width


  15.2 %


(11.5-14.5)


 


Platelet Count


  240 K/uL


(150-450)


 


Mean Platelet Volume


  6.8 fL


(7.2-11.1)


 


Neutrophils (%) (Auto)


  74.1 %


(39.4-72.5)


 


Lymphocytes (%) (Auto)


  15.8 %


(17.6-49.6)


 


Monocytes (%) (Auto)


  7.2 %


(4.1-12.4)


 


Eosinophils (%) (Auto)


  1.9 %


(0.4-6.7)


 


Basophils (%) (Auto)


  1.0 %


(0.3-1.4)


 


Nucleated RBC Relative Count


(auto) 0.0 /100WBC 


 


 


Neutrophils # (Auto)


  4.0 K/uL


(2.0-7.4)


 


Lymphocytes # (Auto)


  0.8 K/uL


(1.3-3.6)


 


Monocytes # (Auto)


  0.4 K/uL


(0.3-1.0)


 


Eosinophils # (Auto)


  0.1 K/uL


(0.0-0.5)


 


Basophils # (Auto)


  0.1 K/uL


(0.0-0.1)


 


Nucleated RBC Absolute Count


(auto) 0.00 K/uL 


 


 


Prothrombin Time


  14.6 seconds


(12.0-14.4)


 


Prothromb Time International


Ratio 1.14 


 


 


Activated Partial


Thromboplast Time 36 seconds


(23-35)


 


Glomerular Filtration Rate


Calc 52.7 


 


 


Calcium Level


  9.8 mg/dl


(8.4-10.2)


 


Total Bilirubin


  0.6 mg/dl


(0.2-1.3)


 


Aspartate Amino Transf


(AST/SGOT) 23 U/L (0-35) 


 


 


Alanine Aminotransferase


(ALT/SGPT) 19 U/L (0-56) 


 


 


Alkaline Phosphatase 71 U/L (0-126) 


 


Troponin I < 0.012 ng/ml 


 


Total Protein


  7.0 gm/dl


(6.3-8.2)


 


Albumin


  4.1 g/dl


(3.5-5.0)








Coagulation








Test


  5/3/18


15:22


 


Prothrombin Time 14.6 seconds 


 


Prothromb Time International


Ratio 1.14 


 


 


Activated Partial


Thromboplast Time 36 seconds 


 











EKG/Imaging


EKG Interpretation


12 lead EKG:


      Rhythm: Normal sinus rhythm, rate 78 beats friend


      Axis: normal 


      QRS: normal


      ST segments: No acute ST changes identified. There is some slight T-wave 

inversion in V1 and V2.


Imaging


CT Head:





IMPRESSION:


1. Soft tissue swelling and hematoma overlies the posterior calvarium at the 

midline.


2. No intracranial hemorrhage or skull fracture.


3. Stable chronic age-related changes.


 


Report Dictated By: Jesús العلي MD at 5/3/2018 3:54 PM


 


Report E-Signed By: Jesús العلي MD  at 5/3/2018 4:03 PM








CT C-Spine:





IMPRESSION:


1. No acute osseous findings along the cervical spine.


2. Chronic multilevel degenerative changes, greatest at C6-C7.


 


Report Dictated By: Jesús العلي MD at 5/3/2018 4:03 PM


 


Report E-Signed By: Jesús العلي MD  at 5/3/2018 4:07 PM


 





CXR:





IMPRESSION: No significant abnormality identified. There is no change from the 

previous study.


 


Report Dictated By: Miles Vasquez at 5/3/2018 3:30 PM


 


Report E-Signed By: Miles Vasquez  at 5/3/2018 3:32 PM





ED Course/Re-evaluation


ED Course


Will obtain labs, CT of the head and C-spine as well as a chest x-ray and EKG.





 05/03/2018 4:15:38 pm - discussed labs and imaging with patient. There is no 

intracranial bleed or fracture of the cervical spine. All of her lab work is 

essentially normal. Discussed signs and symptoms of worsening head injury and 

concussion precautions.


Decision to Disposition Date:  May 3, 2018


Decision to Disposition Time:  16:16





Depart


Departure


Latest Vital Signs





Vital Signs








  Date Time  Temp Pulse Resp B/P (MAP) Pulse Ox O2 Delivery O2 Flow Rate FiO2


 


5/3/18 16:02  72   92   


 


5/3/18 16:00    135/78 (97)    


 


5/3/18 14:49 97.7  20   Room Air  








Impression:  


 Primary Impression:  


 Head injury


Condition:  Improved


Disposition:  HOME OR SELF-CARE


Referrals:  


CANDIS CANALES (PCP)


Patient Instructions:  Head Injury (ED)





Additional Instructions:  


Monitor for signs and symptoms of worsening head injury including worsening 

headache, nausea, vomiting, numbness, tingling, vision changes. Follow-up with 

primary care provider in 2-3 days. If having any worsening or concerning 

symptoms may return to the emergency department.





Problem Qualifiers








 Primary Impression:  


 Head injury


 Encounter type:  initial encounter  Qualified Codes:  S09.90XA - Unspecified 

injury of head, initial encounter








ALMA VARGAS PA-C May 3, 2018 15:06

## 2018-05-03 NOTE — RADIOLOGY IMAGING REPORT
FACILITY: [facility] 

 

PATIENT NAME: Celsa العلي

: 1932

MR: [id number]

V: [id number]

EXAM DATE: 

ORDERING PHYSICIAN: ALMA VARGAS

TECHNOLOGIST: 

 

Location: [facility] [place]

Patient: Celsa العلي

: 1932

MRN: [id number]

Visit/Account:[id number]

Date of Sevice:  2018

 

ACCESSION #: [id number]

 

Study: CHEST SINGLE AP

 

Indication: Pain

 

Comparison study: 2018

 

Findings: AP upright chest demonstrates the presence of an implanted port within the left chest. The 
tip the catheter overlies the SVC. There is no change from the previous study.

 

There is no evidence of acute infiltrate. There is no evidence of pleural effusion or pneumothorax. T
he bony structures are unremarkable.

 

IMPRESSION: No significant abnormality identified. There is no change from the previous study.

 

Report Dictated By: Miles Vasquez at 5/3/2018 3:30 PM

 

Report E-Signed By: Miles Vasquez  at 5/3/2018 3:32 PM

 

WSN:[id number]

## 2018-05-03 NOTE — EKG
FACILITY: [facility] 

 

PATIENT NAME: JOEY NORTH

: [id number]

MR: [id number]

V: [id number]

EXAM DATE: 

ORDERING PHYSICIAN: ALMA VARGAS

TECHNOLOGIST: Huang

 

Test Reason : 

Blood Pressure : ***/*** mmHG

Vent. Rate : 078 BPM     Atrial Rate : 078 BPM

   P-R Int : 126 ms          QRS Dur : 090 ms

    QT Int : 396 ms       P-R-T Axes : 000 057 055 degrees

   QTc Int : 451 ms

 

Sinus rhythm

Nonspecific ST findings

Decreased R wave progression anteriorly

Some artifact in several leads - repeat if needed

 

Confirmed by JONATHON CHRISTINE ([id number]) on 5/3/2018 4:48:28 PM

 

Referred By:             Confirmed By:JONATHON CHRISTINE

## 2018-05-03 NOTE — RADIOLOGY IMAGING REPORT
FACILITY: [facility] 

 

PATIENT NAME: Celsa العلي

: 1932

MR: [id number]

V: [id number]

EXAM DATE: 

ORDERING PHYSICIAN: ALMA VARGAS

TECHNOLOGIST: 

 

Location: [facility]

Patient: Celsa العلي

: 1932

MRN: [id number]

Visit/Account:[id number]

Date of Sevice:  2018

 

ACCESSION #: [id number]

 

EXAMINATION: CT cervical spine without IV contrast

 

HISTORY: Head and neck pain. Fall. LOC.

 

TECHNIQUE:   Thin axial CT images of the cervical spine were obtained without IV contrast, with sagit
jovan and coronal 2D reconstructed images.

One of the following dose optimization techniques was utilized in the performance of this exam: Autom
ated exposure control; adjustment of the mA and/or kV according to the patient's size; or use of an i
terative  reconstruction technique.  Specific details can be referenced in the facility's radiology C
T exam operational policy.

 

COMPARISON:   2016.

 

FINDINGS:

The cervical spine is negative for acute fracture or subluxation. Normal alignment. Vertebral body he
ight is maintained.

 

Stable chronic spondylotic changes in the cervical spine. There is severe disc space narrowing at C6-
C7, with moderate disc space narrowing at C4-C5 and C5-C6. Mild multilevel facet arthropathy, greater
 on the left. The posterior elements are intact.

 

The dens is intact. The C1 ring is intact, with normal alignment at the craniocervical junction.

 

IMPRESSION:

1. No acute osseous findings along the cervical spine.

2. Chronic multilevel degenerative changes, greatest at C6-C7.

 

Report Dictated By: Jesús العلي MD at 5/3/2018 4:03 PM

 

Report E-Signed By: Jesús العلي MD  at 5/3/2018 4:07 PM

 

WSN:[id number]

## 2018-05-03 NOTE — RADIOLOGY IMAGING REPORT
FACILITY: [facility] 

 

PATIENT NAME: Celsa العلي

: 1932

MR: [id number]

V: [id number]

EXAM DATE: 

ORDERING PHYSICIAN: ALMA VARGAS

TECHNOLOGIST: 

 

Location: [facility]

Patient: Celsa العلي

: 1932

MRN: [id number]

Visit/Account:[id number]

Date of Sevice:  2018

 

ACCESSION #: [id number]

 

EXAMINATION: CT head without IV contrast

 

HISTORY: Fall. LOC. Head and neck pain.

 

TECHNIQUE:   Axial CT images of the head were obtained from the vertex to the skull base without IV c
ontrast, with coronal and sagittal 2D reconstructed images.

One of the following dose optimization techniques was utilized in the performance of this exam: Autom
ated exposure control; adjustment of the mA and/or kV according to the patient's size; or use of an i
terative  reconstruction technique.  Specific details can be referenced in the facility's radiology C
T exam operational policy.

 

COMPARISON:   To 2018.

 

FINDINGS:

There is mild generalized parenchymal atrophy, with stable mild patchy low attenuation in the deep wh
ite matter, compatible with chronic small vessel ischemic change. Intracranial vascular calcification
s. Benign basal ganglia calcification bilaterally.

 

No CT evidence of intracranial hemorrhage, mass lesion, or acute infarct. No midline shift or extra-a
xial fluid collections. Gray-white differentiation is maintained.

 

Superficial soft tissue swelling and hematoma overlies the posterior calvarium at the midline. No und
erlying skull fracture. The calvarium is intact. The partially visualized paranasal sinuses and masto
id air cells are unopacified.

 

IMPRESSION:

1. Soft tissue swelling and hematoma overlies the posterior calvarium at the midline.

2. No intracranial hemorrhage or skull fracture.

3. Stable chronic age-related changes.

 

Report Dictated By: Jesús العلي MD at 5/3/2018 3:54 PM

 

Report E-Signed By: Jesús العلي MD  at 5/3/2018 4:03 PM

 

WSN:[id number]

## 2018-05-31 VITALS — SYSTOLIC BLOOD PRESSURE: 103 MMHG | DIASTOLIC BLOOD PRESSURE: 62 MMHG

## 2018-05-31 RX ADMIN — HEPARIN PRN UNIT: 100 SYRINGE at 13:09

## 2018-07-26 ENCOUNTER — HOSPITAL ENCOUNTER (OUTPATIENT)
Dept: HOSPITAL 89 - SPU | Age: 83
End: 2018-07-26
Attending: NURSE PRACTITIONER
Payer: MEDICARE

## 2018-07-26 ENCOUNTER — HOSPITAL ENCOUNTER (OUTPATIENT)
Dept: HOSPITAL 89 - SPU | Age: 83
LOS: 1 days | Discharge: HOME | End: 2018-07-27
Attending: INTERNAL MEDICINE
Payer: MEDICARE

## 2018-07-26 VITALS — SYSTOLIC BLOOD PRESSURE: 133 MMHG | DIASTOLIC BLOOD PRESSURE: 85 MMHG

## 2018-07-26 VITALS — BODY MASS INDEX: 24.96 KG/M2

## 2018-07-26 VITALS — BODY MASS INDEX: 24.96 KG/M2 | BODY MASS INDEX: 24.96 KG/M2

## 2018-07-26 DIAGNOSIS — G62.9: ICD-10-CM

## 2018-07-26 DIAGNOSIS — E55.9: ICD-10-CM

## 2018-07-26 DIAGNOSIS — I48.91: ICD-10-CM

## 2018-07-26 DIAGNOSIS — C54.1: Primary | ICD-10-CM

## 2018-07-26 DIAGNOSIS — M81.0: Primary | ICD-10-CM

## 2018-07-26 DIAGNOSIS — R53.81: ICD-10-CM

## 2018-07-26 DIAGNOSIS — E87.8: ICD-10-CM

## 2018-07-26 DIAGNOSIS — E53.8: ICD-10-CM

## 2018-07-26 DIAGNOSIS — E78.01: ICD-10-CM

## 2018-07-26 DIAGNOSIS — Z90.710: ICD-10-CM

## 2018-07-26 DIAGNOSIS — R53.1: ICD-10-CM

## 2018-07-26 DIAGNOSIS — R53.83: ICD-10-CM

## 2018-07-26 LAB
LDLC SERPL-MCNC: 103 MG/DL
PLATELET COUNT, AUTOMATED: 243 K/UL (ref 150–450)

## 2018-07-26 PROCEDURE — 84520 ASSAY OF UREA NITROGEN: CPT

## 2018-07-26 PROCEDURE — 82607 VITAMIN B-12: CPT

## 2018-07-26 PROCEDURE — 84075 ASSAY ALKALINE PHOSPHATASE: CPT

## 2018-07-26 PROCEDURE — 82947 ASSAY GLUCOSE BLOOD QUANT: CPT

## 2018-07-26 PROCEDURE — 96372 THER/PROPH/DIAG INJ SC/IM: CPT

## 2018-07-26 PROCEDURE — 96523 IRRIG DRUG DELIVERY DEVICE: CPT

## 2018-07-26 PROCEDURE — 82435 ASSAY OF BLOOD CHLORIDE: CPT

## 2018-07-26 PROCEDURE — 84155 ASSAY OF PROTEIN SERUM: CPT

## 2018-07-26 PROCEDURE — 83718 ASSAY OF LIPOPROTEIN: CPT

## 2018-07-26 PROCEDURE — 84450 TRANSFERASE (AST) (SGOT): CPT

## 2018-07-26 PROCEDURE — 84460 ALANINE AMINO (ALT) (SGPT): CPT

## 2018-07-26 PROCEDURE — 82465 ASSAY BLD/SERUM CHOLESTEROL: CPT

## 2018-07-26 PROCEDURE — 84132 ASSAY OF SERUM POTASSIUM: CPT

## 2018-07-26 PROCEDURE — 82040 ASSAY OF SERUM ALBUMIN: CPT

## 2018-07-26 PROCEDURE — 82306 VITAMIN D 25 HYDROXY: CPT

## 2018-07-26 PROCEDURE — 82310 ASSAY OF CALCIUM: CPT

## 2018-07-26 PROCEDURE — 84443 ASSAY THYROID STIM HORMONE: CPT

## 2018-07-26 PROCEDURE — 82374 ASSAY BLOOD CARBON DIOXIDE: CPT

## 2018-07-26 PROCEDURE — 99212 OFFICE O/P EST SF 10 MIN: CPT

## 2018-07-26 PROCEDURE — 82565 ASSAY OF CREATININE: CPT

## 2018-07-26 PROCEDURE — 84295 ASSAY OF SERUM SODIUM: CPT

## 2018-07-26 PROCEDURE — 82247 BILIRUBIN TOTAL: CPT

## 2018-07-26 PROCEDURE — 85025 COMPLETE CBC W/AUTO DIFF WBC: CPT

## 2018-07-26 PROCEDURE — 86304 IMMUNOASSAY TUMOR CA 125: CPT

## 2018-07-26 PROCEDURE — 84478 ASSAY OF TRIGLYCERIDES: CPT

## 2018-07-26 PROCEDURE — 36591 DRAW BLOOD OFF VENOUS DEVICE: CPT

## 2018-07-26 RX ADMIN — HEPARIN PRN UNIT: 100 SYRINGE at 11:57

## 2018-07-26 NOTE — ONCOLOGY FOLLOW UP NOTE
EVENT DATE:  July 26, 2018 



DIAGNOSES

1.  Stage IIIC-1 (pT3a pN1c M0) endometrial adenocarcinoma.  

2.  Chemotherapy induced sensory neuropathy.  

3.  Radiation induced colitis with diarrhea.  



CHIEF COMPLAINT

The patient is here today for followup of her endometrial carcinoma.  



ONCOLOGY HISTORY

Patient is 85-year-old female who was diagnosed with stage IIIC-1 in August of 2014 after she developed abnormal vaginal bleeding and underwent endometrial 
biopsy done on August 5, 2014 which was positive for endometrial adenocarcinoma
, FIGO grade 2-3.  Patient was referred to Dr. Tara Wright and underwent 
total abdominal hysterectomy with bilateral salpingo-oophorectomy with 
lymphadenectomy and pelvic washing done on August 20, 2014.  Final pathology 
was positive for endometrioid adenocarcinoma, FIGO grade 3.  The tumor was 5 cm 
with 100% invasion into the myometrium with focal serosal involvement.  
Lymphovascular invasion was positive, but no cervical stromal or parametrial 
involvement.  She was staged as stage IIIC-1 (pT3a pN1).  She received adjuvant 
chemotherapy with carboplatin and Taxol for four cycles, completed on January 8
, 2015.  She also received radiation therapy with external beam radiation 
therapy and intracavitary radiation therapy.  Her tumor marker currently is 8, 
which is very, very good.



HISTORY OF PRESENT ILLNESS

Patient is here today for followup of her endometrial carcinoma.  She is doing 
fine currently.  She has some pain in her legs.  She has some neuropathy in her 
hands and feet from previous chemotherapy.  She is weak, tired and fatigued.   



PAST MEDICAL HISTORY

1.  Uterine cancer. 

2.  Melanoma of the cheek in 2006. 

3.  Pancreatitis in 2013.  

4.  Atrial fibrillation. 



PAST SURGICAL HISTORY

1.  Hysterectomy. 

2.  Mastectomy. 

3.  Tonsillectomy. 



SOCIAL HISTORY

Patient never smoked.  She reports she does not drink alcohol or use illicit 
drugs.  



FAMILY HISTORY

Cancer in the brother and mother. 



MEDICATIONS

1.  Fosamax 70 mg weekly.  

2.  Alprazolam 0.5 mg at night one-half p.r.n. 

3.  Eliquis 5 mg tablet twice daily.  

4.  Coreg 25 mg tablet two times daily. 

5.  Neurontin 600 mg twice daily. 

6.  Lomotil p.r.n. for diarrhea. 



ALLERGIES

No known drug allergies. 



REVIEW OF SYSTEMS

CONSTITUTIONAL:  No appetite or weight change.  No fever, chills or sweating.  
No recent infection.    

HEENT:  

Ears:  No tinnitus or hearing problem.  

Nose:  No nasal discharge or epistaxis.  

Throat:  No sore throat or mouth ulcers.  

Eyes:  No diplopia or visual changes.  

RESPIRATORY:  No shortness of breath.  No cough, expectoration or hemoptysis.  
  

CARDIOVASCULAR:  She has swelling of the right lower extremity.  

GASTROINTESTINAL:  No nausea or vomiting.  No diarrhea or constipation.  No 
change in bowel movements.  No heartburn or swallowing difficulties.  She has 
epigastric pain.  No jaundice.  No hematemesis, melena or rectal bleeding.     
   

GENITOURINARY:  The patient has increased frequency of urine.  

MUSCULOSKELETAL:  She has pain in the legs.  

NEUROLOGICAL:  She has neuropathy in her hands and feet.  

HEMATOLOGIC/LYMPHATIC:  No bleeding or easy bruising.  She is weak, tired and 
fatigued.  No enlarged lymph nodes.

SKIN:  No skin rash or lumps.  

PSYCHIATRIC:  No anxiety or depression.  



PHYSICAL EXAMINATION

GENERAL:  Looks stable.  Well-developed, well-nourished, and in no acute 
distress. 

VITAL SIGNS: Blood pressure 133/85, pulse 89 per minute, respirations 16 per 
minute, temperature 97.3, pulse ox 91% on room air.

HEENT:  

Head:  Atraumatic.  No sinus tenderness to palpation.  

Eyes:  No icterus or conjunctivitis.  

Mouth and throat:  No oral thrush or mucositis.    

NECK:  Supple.  No cervical or supraclavicular lymphadenopathy.  

LUNGS:  Clear to auscultation and percussion bilaterally.      

HEART:  Regular rate and rhythm.  No gallops, murmurs, clicks or rubs.

ABDOMEN:  Soft and lax.  There is mild tenderness over the epigastrium, but no 
guarding or rebound tenderness.  No hepatosplenomegaly.  No masses.     

EXTREMITIES:  There is some pitting edema of the right lower extremity.    

LYMPHATICS:  No peripheral lymphadenopathy.   

NEUROLOGICAL:  Conscious, alert and oriented times three.  No focal motor or 
sensory deficits.  

PSYCHIATRIC:  Mood and affect appear normal.  

SKIN:  No skin rash, bruise or purpuric eruption.



DIAGNOSTIC DATA

CBC shows white count 6.3, hemoglobin 13.6, hematocrit 39.1, platelets 243,000.
  The CA-125 is still pending.  



ASSESSMENT

1.  Stage IIIC (pT3a pN1c M0) endometrial adenocarcinoma, FIGO grade 3 with 100
% invasion of the myometrium.  Patient received adjuvant chemotherapy with 
carboplatin and Taxol for six cycles, completed January 8, 2015.  She received 
also adjuvant radiation therapy with external beam radiation therapy and 
intracavitary seed implant.  She is currently in remission.  Her CA-125 was 
normal and level for today is pending.  I am planning to continue followup.  I 
will see her again in four months with CBC, chem panel and CA-125.  By the end 
of the year patient will complete four years and I will see her after that 
every six months. 

2.  Chemotherapy-induced neuropathy which is stable, involving the hands and 
feet.  Will continue to monitor.  

3.  History of atrial fibrillation.

4.  History of melanoma of the cheek in 2006.



PLAN

1.  Continue followup.

2.  Patient to return in four months with CBC, chem panel, CA-125.

3.  Continue gabapentin one capsule three times daily.

4.  Patient is to contact us for any new concerns or complaints.



MTDD

## 2018-09-05 ENCOUNTER — HOSPITAL ENCOUNTER (OUTPATIENT)
Dept: HOSPITAL 89 - MRI | Age: 83
End: 2018-09-05
Attending: NURSE PRACTITIONER
Payer: MEDICARE

## 2018-09-05 VITALS — BODY MASS INDEX: 24.96 KG/M2

## 2018-09-05 DIAGNOSIS — M48.061: Primary | ICD-10-CM

## 2018-09-05 PROCEDURE — 72148 MRI LUMBAR SPINE W/O DYE: CPT

## 2018-09-05 PROCEDURE — 72195 MRI PELVIS W/O DYE: CPT

## 2018-09-05 NOTE — RADIOLOGY IMAGING REPORT
[place] [facility] [place] 

 

PATIENT NAME: Celsa العلي

: 1932

MR: [id number]

V: [id number]

EXAM DATE: 

ORDERING PHYSICIAN: CATHY PAREDES

TECHNOLOGIST: 

 

Location: [facility]

Patient: Celsa العلي

: 1932

MRN: [id number]

Visit/Account:[id number]

Date of Sevice:  2018

 

ACCESSION #: [id number]

 

PELVIS W/O CONTRAST

 

Provided history: Left hip and low back pain

Additional pertinent history:   none

 

TECHNIQUE: Multiplanar multisequence MRI of the pelvis was performed  without intravenous contrast.

 

Additional focused sequences:  none

 

COMPARISON STUDIES: Left hip MRI 18

 

FINDINGS:

There is a persisting fluid collection along the posterior lateral margin of the proximal right femur
 today measuring up to 2.4 x 4.4 cm transverse diameter and 6.7 cm craniocaudal not substantially neo
nged from prior exam. Fixation hardware grossly unchanged in the right hip. No abnormal signal develo
ping in the femoral head or neck.

The left, normal signal is maintained femoral head neck and proximal shaft. No joint effusion. Potent
ial tear of the posterior labrum is mostly obscured by motion artifact on this exam but appears to be
 best seen on coronal image 9 of series 7. That is grossly unchanged from prior.

Minor degenerative cyst formation the posterior right iliac bone is stable. Minimal increased T2 hype
rintensity in the sacral margin of the right SI joint. No changes in the SI joint interim.

Soft tissues otherwise the pelvis are negative.

 

There is a well-defined fluid collection at the right ischio-hamstring junction from an old tear and 
minimal similar changes on the left, stable from prior.

 

 

IMPRESSION:

1. Refer to comments made on the MRI lumbar spine which probably accounts for the patient's left hip 
and lower back pain.

2. No new significant abnormalities of the left hip. Probable labral tear is difficult to demonstrate
 today due to motion artifact.

3. Persisting fluid collection adjacent to the right hip, little changed.

4. Persisting fluid collection at the right ischio-hamstring junction from an old tear. Minimal simil
ar changes on the left also stable.

 

Report called to  CATHY PAREDES,  2018 4:57 PM.

 

Report Dictated By: Guy Mclean MD at 2018 4:57 PM

 

Report E-Signed By: Guy Mclean MD  at 2018 5:21 PM

 

WSN:[id number]

## 2018-09-05 NOTE — RADIOLOGY IMAGING REPORT
FACILITY: [facility] - [place] 

 

PATIENT NAME: Celsa العلي

: 1932

MR: [id number]

V: [id number]

EXAM DATE: 

ORDERING PHYSICIAN: CATHY PAREDES

TECHNOLOGIST: 

 

Location: [facility]

Patient: Celsa العلي

: 1932

MRN: [id number]

Visit/Account:[id number]

Date of Sevice:  2018

 

ACCESSION #: [id number]

 

L SPINE W/O CONTRAST

 

Provided history: Left hip and low back pain

Additional pertinent history:   none

 

TECHNIQUE: Multiplanar multisequence lumbar MRI was performed    without intravenous contrast.

 

COMPARISON STUDIES: No relevant priors

 

FINDINGS:

Segment numbering: Lumbosacral junction at L5-S1.  No transitional segment.

 

Extra-spinal soft tissues:  Negative

Alignment: Normal

Osseous signal pattern: Mild Modic 1 changes surround the L3-4 disc. Moderate anterior wedging at T12
 appears old. There are multiple old Schmorl's nodes.

Distal thoracic cord / conus / cauda equina: negative

 

Disc Spaces:

Lower T spine: Advanced narrowing at T11-12 and degeneration at T12-L1. No herniation or significant 
stenosis at these levels.

 

L1-L2: The disk reveals moderate-severe narrowing and degeneration.     Prominent bulge of the disc m
ore prominent the right than left. No fam herniation or significant central stenosis.

Lateralization of end-plate spurs and bilateral facet hypertrophy results in mild narrowing of the fo
ramina.

 

L2-L3: The disk reveals moderate-severe narrowing and degeneration.       There is a large contained 
central disc herniation that extends behind the upper L3 body maintain a connection to the disc of or
igin. This measures up to 19 mm craniocaudal diameter, 11 mm AP diameter and 18 mm transverse. This r
esults in severe compromise of the central canal. Both lateral recesses are severely effaced.

Lateralization of end-plate spurs and bilateral facet hypertrophy results in moderate left and only m
ild right foraminal narrowing.

 

L3-L4: The disk reveals moderate-severe narrowing and degeneration.        Prominent bulge of the dis
c without herniation or severe central stenosis.

A left foraminal disc protrusion and facet hypertrophy severely compromises the left foramen displaci
ng and deforming the exiting left L3 nerve. Right foramen only mildly narrowed.

 

L4-L5: The disk reveals severe narrowing and degeneration.        Mild broad based asymmetric left la
teralizing disc-osteophyte complex without herniation or central stenosis. Left lateral recess mildly
 narrowed.

Lateralization of end-plate spurs and bilateral facet hypertrophy results in mild to moderate narrowi
ng of both foramina.

 

L5-S1: The disk reveals moderate- severe narrowing and degeneration.        Moderate right central di
sk-osteophyte complex minimally deforms the ventral thecal sac without compromise of the canal.

Lateralization of end-plate spurs and bilateral facet hypertrophy results in severe left and moderate
 right foraminal narrowing.

 

 

IMPRESSION:

1. The most significant finding is severe central stenosis at L2-3 secondary to a large contained dis
c herniation.

2. Additional potentially significant findings include severe narrowing of the left L3-4 foramen and 
the left L5-S1 foramen.

 

Report called to  CATHY PAREDES,  2018 4:49 PM.

 

Report Dictated By: Guy Mclean MD at 2018 4:49 PM

 

Report E-Signed By: Guy Mclean MD  at 2018 5:21 PM

 

WSN:[id number]

## 2018-11-15 ENCOUNTER — HOSPITAL ENCOUNTER (INPATIENT)
Dept: HOSPITAL 89 - ER | Age: 83
LOS: 5 days | Discharge: HOME HEALTH | DRG: 378 | End: 2018-11-20
Attending: SURGERY | Admitting: SURGERY
Payer: MEDICARE

## 2018-11-15 ENCOUNTER — HOSPITAL ENCOUNTER (OUTPATIENT)
Dept: HOSPITAL 89 - AMB | Age: 83
End: 2018-11-15
Payer: MEDICARE

## 2018-11-15 VITALS
BODY MASS INDEX: 25.83 KG/M2 | WEIGHT: 155 LBS | HEIGHT: 65 IN | BODY MASS INDEX: 25.83 KG/M2 | HEIGHT: 65 IN | WEIGHT: 155 LBS

## 2018-11-15 VITALS — DIASTOLIC BLOOD PRESSURE: 79 MMHG | SYSTOLIC BLOOD PRESSURE: 116 MMHG

## 2018-11-15 VITALS — BODY MASS INDEX: 24.96 KG/M2

## 2018-11-15 VITALS — DIASTOLIC BLOOD PRESSURE: 55 MMHG | SYSTOLIC BLOOD PRESSURE: 122 MMHG

## 2018-11-15 VITALS — SYSTOLIC BLOOD PRESSURE: 112 MMHG | DIASTOLIC BLOOD PRESSURE: 78 MMHG

## 2018-11-15 DIAGNOSIS — G62.9: ICD-10-CM

## 2018-11-15 DIAGNOSIS — R50.9: ICD-10-CM

## 2018-11-15 DIAGNOSIS — Z96.89: ICD-10-CM

## 2018-11-15 DIAGNOSIS — K26.4: Primary | ICD-10-CM

## 2018-11-15 DIAGNOSIS — I10: ICD-10-CM

## 2018-11-15 DIAGNOSIS — Z85.42: ICD-10-CM

## 2018-11-15 DIAGNOSIS — E78.00: ICD-10-CM

## 2018-11-15 DIAGNOSIS — K92.1: ICD-10-CM

## 2018-11-15 DIAGNOSIS — D62: ICD-10-CM

## 2018-11-15 DIAGNOSIS — Z90.710: ICD-10-CM

## 2018-11-15 DIAGNOSIS — Z66: ICD-10-CM

## 2018-11-15 DIAGNOSIS — H40.9: ICD-10-CM

## 2018-11-15 DIAGNOSIS — E83.51: ICD-10-CM

## 2018-11-15 DIAGNOSIS — M81.0: ICD-10-CM

## 2018-11-15 DIAGNOSIS — R11.2: Primary | ICD-10-CM

## 2018-11-15 DIAGNOSIS — E87.6: ICD-10-CM

## 2018-11-15 DIAGNOSIS — F41.8: ICD-10-CM

## 2018-11-15 DIAGNOSIS — R42: ICD-10-CM

## 2018-11-15 DIAGNOSIS — R53.1: ICD-10-CM

## 2018-11-15 DIAGNOSIS — I48.2: ICD-10-CM

## 2018-11-15 LAB — PLATELET COUNT, AUTOMATED: 322 K/UL (ref 150–450)

## 2018-11-15 PROCEDURE — 87077 CULTURE AEROBIC IDENTIFY: CPT

## 2018-11-15 PROCEDURE — 86901 BLOOD TYPING SEROLOGIC RH(D): CPT

## 2018-11-15 PROCEDURE — 83735 ASSAY OF MAGNESIUM: CPT

## 2018-11-15 PROCEDURE — 84295 ASSAY OF SERUM SODIUM: CPT

## 2018-11-15 PROCEDURE — 99285 EMERGENCY DEPT VISIT HI MDM: CPT

## 2018-11-15 PROCEDURE — 84155 ASSAY OF PROTEIN SERUM: CPT

## 2018-11-15 PROCEDURE — 84520 ASSAY OF UREA NITROGEN: CPT

## 2018-11-15 PROCEDURE — 86301 IMMUNOASSAY TUMOR CA 19-9: CPT

## 2018-11-15 PROCEDURE — 84450 TRANSFERASE (AST) (SGOT): CPT

## 2018-11-15 PROCEDURE — 36416 COLLJ CAPILLARY BLOOD SPEC: CPT

## 2018-11-15 PROCEDURE — 85014 HEMATOCRIT: CPT

## 2018-11-15 PROCEDURE — 93005 ELECTROCARDIOGRAM TRACING: CPT

## 2018-11-15 PROCEDURE — 82435 ASSAY OF BLOOD CHLORIDE: CPT

## 2018-11-15 PROCEDURE — 84132 ASSAY OF SERUM POTASSIUM: CPT

## 2018-11-15 PROCEDURE — 74240 X-RAY XM UPR GI TRC 1CNTRST: CPT

## 2018-11-15 PROCEDURE — 85025 COMPLETE CBC W/AUTO DIFF WBC: CPT

## 2018-11-15 PROCEDURE — 96361 HYDRATE IV INFUSION ADD-ON: CPT

## 2018-11-15 PROCEDURE — 86920 COMPATIBILITY TEST SPIN: CPT

## 2018-11-15 PROCEDURE — 84460 ALANINE AMINO (ALT) (SGPT): CPT

## 2018-11-15 PROCEDURE — 97161 PT EVAL LOW COMPLEX 20 MIN: CPT

## 2018-11-15 PROCEDURE — 82247 BILIRUBIN TOTAL: CPT

## 2018-11-15 PROCEDURE — 83690 ASSAY OF LIPASE: CPT

## 2018-11-15 PROCEDURE — 82040 ASSAY OF SERUM ALBUMIN: CPT

## 2018-11-15 PROCEDURE — 70450 CT HEAD/BRAIN W/O DYE: CPT

## 2018-11-15 PROCEDURE — 97166 OT EVAL MOD COMPLEX 45 MIN: CPT

## 2018-11-15 PROCEDURE — 82947 ASSAY GLUCOSE BLOOD QUANT: CPT

## 2018-11-15 PROCEDURE — 84075 ASSAY ALKALINE PHOSPHATASE: CPT

## 2018-11-15 PROCEDURE — 82374 ASSAY BLOOD CARBON DIOXIDE: CPT

## 2018-11-15 PROCEDURE — 84484 ASSAY OF TROPONIN QUANT: CPT

## 2018-11-15 PROCEDURE — 88305 TISSUE EXAM BY PATHOLOGIST: CPT

## 2018-11-15 PROCEDURE — 86850 RBC ANTIBODY SCREEN: CPT

## 2018-11-15 PROCEDURE — 85018 HEMOGLOBIN: CPT

## 2018-11-15 PROCEDURE — 74183 MRI ABD W/O CNTR FLWD CNTR: CPT

## 2018-11-15 PROCEDURE — 71045 X-RAY EXAM CHEST 1 VIEW: CPT

## 2018-11-15 PROCEDURE — 81001 URINALYSIS AUTO W/SCOPE: CPT

## 2018-11-15 PROCEDURE — 36415 COLL VENOUS BLD VENIPUNCTURE: CPT

## 2018-11-15 PROCEDURE — 82948 REAGENT STRIP/BLOOD GLUCOSE: CPT

## 2018-11-15 PROCEDURE — 82310 ASSAY OF CALCIUM: CPT

## 2018-11-15 PROCEDURE — 82378 CARCINOEMBRYONIC ANTIGEN: CPT

## 2018-11-15 PROCEDURE — 74177 CT ABD & PELVIS W/CONTRAST: CPT

## 2018-11-15 PROCEDURE — 86900 BLOOD TYPING SEROLOGIC ABO: CPT

## 2018-11-15 PROCEDURE — 82565 ASSAY OF CREATININE: CPT

## 2018-11-15 PROCEDURE — 96360 HYDRATION IV INFUSION INIT: CPT

## 2018-11-15 RX ADMIN — ONDANSETRON HYDROCHLORIDE PRN MG: 2 INJECTION, SOLUTION INTRAMUSCULAR; INTRAVENOUS at 23:59

## 2018-11-15 RX ADMIN — BIMATOPROST SCH ML: 0.1 SOLUTION/ DROPS OPHTHALMIC at 21:00

## 2018-11-15 RX ADMIN — ONDANSETRON HYDROCHLORIDE PRN MG: 2 INJECTION, SOLUTION INTRAMUSCULAR; INTRAVENOUS at 18:20

## 2018-11-15 RX ADMIN — THIAMINE HYDROCHLORIDE PRN MLS/HR: 100 INJECTION, SOLUTION INTRAMUSCULAR; INTRAVENOUS at 18:20

## 2018-11-15 RX ADMIN — LOTEPREDNOL ETABONATE SCH ML: 5 SUSPENSION/ DROPS OPHTHALMIC at 21:00

## 2018-11-15 NOTE — RADIOLOGY IMAGING REPORT
FACILITY: [facility] - [place] 

 

PATIENT NAME: Celsa العلي

: 1932

MR: [id number]

V: [id number]

EXAM DATE: 

ORDERING PHYSICIAN: KRISTEN KAHN

TECHNOLOGIST: 

 

[place] [facility] [place]

Patient: Celsa العلي

: 1932

MRN: [id number]

Visit/Account:[id number]

Date of Sevice: 11/15/2018

 

ACCESSION #: [id number]

 

HEAD W/O CONTRAST

 

Provided history: vertigo, difficulty word finding

Additional pertinent history:   none

 

TECHNIQUE: Imaging was obtained from the skull base through the vertex without intravenous contrast.

Source images were reformatted in the coronal sagittal planes.

 

One of the following dose optimization techniques was utilized in the performance of this exam: Autom
ated exposure control; adjustment of the mA and/or kV according to the patient's size; or use of an i
terative reconstruction technique.  Specific details can be referenced in the facility's radiology CT
 exam operational policy.

 

Additional imaging: none

 

COMPARISON STUDIES: 18

 

FINDINGS:

Brain volume:  There is prominence of the ventricles, sulci and fissures that is appropriate for age.


 

Acute cortical ischemia:  None

 

Chronic cortical and ganglionic  ischemia:  none significant

 

Hemorrhage:  None

 

Masses / edema:  None

 

White matter:  There are scattered white matter hypodensities, non-specific in morphology and distrib
ution that are within normal limits for age.

 

Vessels:  Normal

 

Extra-axial:  None significant

 

Calvarium / scalp:  Negative

 

Skull base:  Incomplete fusion of posterior C1 ring, not significant.

 

Visualized sinuses / orbits:  negative

 

 

 

IMPRESSION:

 Age-appropriate changes. No evidence of mass, acute ischemia or hemorrhage.

 

Report Dictated By: Guy Mclean MD at 11/15/2018 4:11 PM

 

Report E-Signed By: Guy Mclean MD  at 11/15/2018 4:13 PM

 

WSN:[id number]

## 2018-11-15 NOTE — RADIOLOGY IMAGING REPORT
FACILITY: [facility] 

 

PATIENT NAME: Celsa العلي

: 1932

MR: [id number]

V: [id number]

EXAM DATE: 

ORDERING PHYSICIAN: KRISTEN KAHN

TECHNOLOGIST: 

 

Location: [facility] - [place]

Patient: Celsa العلي

: 1932

MRN: [id number]

Visit/Account:[id number]

Date of Sevice: 11/15/2018

 

ACCESSION #: [id number]

 

ABDOMEN/PELVIS WITH CONTRAST

 

Provided history: vertigo, difficulty word finding

Additional pertinent history:   Chest pain, vomiting bile

 

TECHNIQUE: Spiral scan was obtained from the lower chest through the symphysis  with intravenous cont
rast

 

Contrast dose: 75 mL Isovue 370 intravenously.

Source images were reformatted in the coronal and sagittal planes.

 

Additional series performed today:  none

 

One of the following dose optimization techniques was utilized in the performance of this exam: Autom
ated exposure control; adjustment of the mA and/or kV according to the patient's size; or use of an i
terative reconstruction technique.  Specific details can be referenced in the facility's radiology CT
 exam operational policy.

 

COMPARISON STUDIES: CT 12

 

FINDINGS:

Lower chest: Minor scarring both lung bases.

 

Liver/biliary: Negative

 

Pancreas: There is generalized moderate diffuse volume loss of the pancreas.  No calcification or liang
jovan dilation.  No definable pancreatic mass.  Lateral to the pancreatic, there is soft tissue protrud
ing into the lumen of the second duodenum nearly encircling it measuring up to 2.3 cm craniocaudal di
ameter and 1.5 x 2 cm transverse, new from prior.  This is at the level of the ampulla.  The duodenal
 bulb is moderately distended with a thin wall.  The gastric antrum demonstrates circumferential low-
attenuation thickening of the wall but no definable mass.

There are several borderline prominent regional lymph nodes.  The largest and most concerning is on i
mage 45 in the para-aortic space measuring 14 x 14 mm.  There are additional retroperitoneal nodes ar
e smaller.

 

Spleen: Negative

 

Adrenal glands: Negative

 

Kidneys / ureters / bladder / genitourinary / retroperitoneum: Negative

 

Bowel / peritoneum / mesenteries: Stomach is mildly distended with a thin wall leading to low-attenua
tion circumflex circumflex a partial thickening of the wall the antrum and a dilated first duodenum. 
 See above comments.

 

Vessels: age appropriate plaque

 

Lymph nodes:  See above.  No significant additional lymph node findings.

 

Body wall: Negative

 

Bones: Advanced degenerative changes lumbar spine.  Moderate anterior inferior wedging of T12 that ap
pears old.  No concerning lytic or blastic bone lesions.  Right hip compression screw placed in the i
nterim.

 

 

IMPRESSION:

 

1.  There is a near-circumferential mass involving the lower second segment of the duodenum producing
 partial obstruction to the duodenal bulb and stomach, highly concerning for primary adenocarcinoma. 
 There is probable early upper abdominal lymphadenopathy metastatic disease as above.  No hepatic les
ions are visible.

2.  Otherwise expected age-related changes.

 

Report called to  KRISTEN KAHN,  11/15/2018 4:14 PM.

 

Report Dictated By: Guy Mclean MD at 11/15/2018 4:14 PM

 

Report E-Signed By: Guy Mclean MD  at 11/15/2018 4:34 PM

 

WSN:[id number]

## 2018-11-15 NOTE — GEN SURGERY HISTORY & PHYSICAL
History of Present Illness


Chief Complaint


N/V


History of Present Illness


85yo female presents to the ER with 1 day of N/V.  Felt good prior to this.  She


does have chronic mild epigastric abdominal pain for over 5 years and she has 


had pancreatitis in the past.  She has a h/o melanoma and more recently 


endometrial cancer.  In the ER, she had persistent N/V and was found on CT 


abd/pelvis to have a D2 lesion that appears to be causing obstruction.  I have 


been consulted for further evaluation and management.





History


Problems:  


(1) Peripheral neuropathy


Status:  Chronic


(2) Adenocarcinoma of endometrium, stage 3


Status:  Chronic


(3) Atrial fibrillation


Status:  Chronic


(4) Glaucoma


Status:  Chronic


(5) Hypertension


Status:  Chronic


(6) H/O total hysterectomy


Onset Date:  ~ 8/20/2014      Status:  Resolved


(7) H/O dilation and curettage


Onset Date:  ~ 2005      Status:  Resolved


(8) Non-smoker


Status:  Chronic


(9) Alcohol consumption of one to four drinks per day


Status:  Chronic


Home Meds


Active Scripts


Ondansetron Hcl (ZOFRAN) 4 Mg Tablet, 4 MG PO Q8H for Nausea, #10 TAB


   Prov:KRISTEN KAHN MD         11/15/18


Reported Medications


Gabapentin (GABAPENTIN) 300 Mg Capsule, 600 MG PO BID, CAPSULE


   4/12/18


Amiodarone HCl/D5w (Amiodarone 150 mg/100 ml-D5w) 150 Mg/100 Ml (1.5 Mg/Ml) 


Plast..bag, 200 MG PO BID


   4/12/18


Apixaban (ELIQUIS) 5 Mg Tablet, 5 MG PO


   2/2/18


Pyridoxine Hcl (VITAMIN B-6) 25 Mg Tablet, 25 MG PO DAILY


   11/30/17


Cyanocobalamin (Vitamin B-12) (VITAMIN B-12) 1,000 Mcg Tablet, 1000 MCG PO DAILY


   11/30/17


Calcium Carbonate (CALCIUM) 500 Mg Tab.chew, 500 MG PO BID, TAB.CHEW


   11/24/16


Allergies:  


Coded Allergies:  


     No Known Drug Allergies (Unverified , 11/15/18)


Patient History:  


FH: breast cancer


  MOTHER, Onset:68


FH: colon cancer


  GRANDFATHER, Onset:70





Review of Systems


All Systems Reviewed/Normal:  Yes, Except as Noted


Gastrointestinal:  Nausea, Vomiting, Abdominal Pain





Exam


General Appearance:  Alert, Awake, Afebrile, Other (Vomiting as I evaluate her; 


she refuses NG tube.)


Neuro:  No Gross deficits (other than chronic chemo-related neuropathy)


Eyes:  PERRLA


GI:  Other (Soft, epigastric TTP)


Extremities:  Warm, Perfused


Psych:  Alert & Oriented X3, Appropriate Mood & Affect





Medical Decision Making


Data Points


Result Diagram:  


11/15/18 1256                                                                   


            11/15/18 1256








Assessment and Plan


Problems:  


(1) Duodenal obstruction


Status:  Acute


Assessment & Plan:  11/15/18:  I have explained the CT findings to the patient 


in detail.  I have told her that there is a lesion in her duodenum that is 


suspicious for a cancer but this would need to be further evaluated endoscopic


ally.  She immediately responded that if it is cancer, she does not wish to have


any surgery or chemotherapy but just wishes to be made comfortable.  I discussed


the possibility of a feeding tube and she indicated that she is not interested 


in any sort of feeding tube.  She is interested in and EGD to evaluate her 


duodenum.  Will admit her to Med/Surg, NPO, IV fluids, nausea and pain control. 


Will ask the Dr. Byrne with the Hospitalist Service to consult and help 


manage her comorbidities and assist with end of life planning if required.  Will


hold off on her Eliquis and will plan on EGD with biopsies the day after 


tomorrow after her Eliquis has worn off.  I have explained this plan to her in 


detail and she is in agreement with this plan.





Condition


Stable.


Time Spent:  < 30 min





Venous Thromboembolism


VTE Risk


Physician Assess for VTE Risk:  Yes


Patient's VTE Risk:  Low





VTE Diagnostic Test


2 Days Prior to Admit:  No





Antithrombotics


Is Pt On Any Antithrombotics?:  No











ULLRICH,JOHN A MD              Nov 15, 2018 18:27

## 2018-11-15 NOTE — EKG
[place] [facility] 

 

PATIENT NAME: JOEY NORTH

: [id number]

MR: [id number]

V: [id number]

EXAM DATE: 

ORDERING PHYSICIAN: KRISTEN KAHN

TECHNOLOGIST: LINDSEY

 

Test Reason : SICK TO STOMACH

Blood Pressure : ***/*** mmHG

Vent. Rate : 103 BPM     Atrial Rate : 103 BPM

   P-R Int : 150 ms          QRS Dur : 076 ms

    QT Int : 354 ms       P-R-T Axes : 031 020 067 degrees

   QTc Int : 463 ms

 

Sinus tachycardia

R wave progression consistent with an old ant/sep MI vs lead placement

No ST-T abnormalities

Relatively unchanged from previous

 

Confirmed by JOS JOHNSON ([id number]) on 11/15/2018 6:26:57 PM

 

Referred By:  FEMI           Confirmed By:JOS JOHNSON

## 2018-11-15 NOTE — RADIOLOGY IMAGING REPORT
FACILITY: [facility] 

 

PATIENT NAME: Celsa العلي

: 1932

MR: [id number]

V: [id number]

EXAM DATE: 

ORDERING PHYSICIAN: KRISTEN KAHN

TECHNOLOGIST: 

 

Location: [facility] - [place]

Patient: Celsa العلي

: 1932

MRN: [id number]

Visit/Account:[id number]

Date of Sevice: 11/15/2018

 

ACCESSION #: [id number]

 

Study: Single portable view of the chest.

 

Indication: Chest pain.

 

Comparison study: May 3, 2018

 

Technique: Single AP view of the chest demonstrates no evidence of acute infiltrate. There is no evid
ence of pleural effusion or pneumothorax. The mediastinal, cardiac, and diaphragmatic contours are un
remarkable.

 

There is an implanted port present within the left chest. The tip of the catheter is at the confluenc
e of the brachiocephalic veins. This is unchanged.

 

IMPRESSION: No acute cardiopulmonary abnormality identified.

 

Report Dictated By: Miles Vasquez at 11/15/2018 2:00 PM

 

Report E-Signed By: Miles Vasquez  at 11/15/2018 2:03 PM

 

WSN:[id number]

## 2018-11-15 NOTE — ER REPORT
History and Physical


Time Seen By MD:  13:10


Hx. of Stated Complaint:  


PATIENT STARTED HAVING EPIGASTRIC PAIN, NAUSEA AND VOMITING FOR ABOUT 4HOURS.


HPI/ROS


CHIEF COMPLAINT: Vomiting





HISTORY OF PRESENT ILLNESS: Patient awoke this morning feeling mildly 


uncomfortable. At approximately 10 she began to have epigastric abdominal pain 


and started vomiting food contents. Patient has had retching multiple times 


though minimal vomit. Patient has continued epigastric gnawing pain that feels 


like prior pancreatitis. Patient has no shortness of breath, chest pain, 


myalgias, fever, UTI symptoms. She has no sick contacts. She has no diarrhea or 


constipation. Pain is nonradiating. Pain is 7 out of 10.





REVIEW OF SYSTEMS:


Constitutional: No fever, no chills.


Eyes: No discharge.


ENT: No sore throat. 


Cardiovascular: No chest pain, no palpitations.


Respiratory: No cough, no shortness of breath.


Gastrointestinal: above


Genitourinary: No hematuria.


Musculoskeletal: No back pain.


Skin: No rashes.


Neurological: No headache.


Remainder of the 14 system rev:  Yes


Allergies:  


Coded Allergies:  


     No Known Drug Allergies (Unverified , 11/15/18)


Home Meds


Reported Medications


Gabapentin (GABAPENTIN) 300 Mg Capsule, 600 MG PO BID, CAPSULE


   4/12/18


Amiodarone HCl/D5w (Amiodarone 150 mg/100 ml-D5w) 150 Mg/100 Ml (1.5 Mg/Ml) 


Plast..bag, 200 MG PO BID


   4/12/18


Apixaban (ELIQUIS) 5 Mg Tablet, 5 MG PO


   2/2/18


Pyridoxine Hcl (VITAMIN B-6) 25 Mg Tablet, 25 MG PO DAILY


   11/30/17


Cyanocobalamin (Vitamin B-12) (VITAMIN B-12) 1,000 Mcg Tablet, 1000 MCG PO DAILY


   11/30/17


Calcium Carbonate (CALCIUM) 500 Mg Tab.chew, 500 MG PO BID, TAB.CHEW


   11/24/16


Reviewed Nurses Notes:  Yes


Old Medical Records Reviewed:  Yes


Hx Smoking:  No


Smoking Status:  Never Smoker


Exposure to Second Hand Smoke?:  No


Hx Substance Use Disorder:  No


Hx Alcohol Use:  Yes (nando before bedtime)


Constitutional





Vital Sign - Last 24 Hours








 11/15/18 11/15/18 11/15/18 11/15/18





 13:07 13:18 13:30 13:33


 


Temp 98.7   


 


Pulse 111 108  103


 


Resp 14   


 


B/P (MAP) 122/99  105/85 (92) 


 


Pulse Ox 94 94  91


 


O2 Delivery Room Air   


 


    





 11/15/18 11/15/18 11/15/18 





 13:48 14:00 14:03 


 


Pulse 100  ??? 


 


B/P (MAP)  111/75 (87)  


 


Pulse Ox 90  91 








Physical Exam


   General Appearance: The patient is alert, has no immediate need for airway 


protection and no signs of toxicity. 


Eyes: Pupils equal and round no pallor or injection.


ENT, Mouth: Mucous membranes are moist.


Respiratory: There are no retractions, lungs are clear to auscultation.


Cardiovascular: Regular rate and rhythm. 


Gastrointestinal:  epigastric ttp 


Neurological: alert, moves all ext


Skin: Warm and dry, no rashes.


Musculoskeletal:  


      Extremities are nontender; baseline rle edema, and have full range of 


motion.





DIFFERENTIAL DIAGNOSIS: After history and physical exam differential diagnosis 


was considered for abdominal pain including but not limited to obstruction, aaa,


appendicitis, cholecystitis, gastritis and urinary tract infection.





Medical Decision Making


Data Points


Result Diagram:  


11/15/18 1256                                                                   


            11/15/18 1256





Laboratory





Hematology








Test


 11/15/18


12:56 11/15/18


14:10


 


Red Blood Count


 4.01 M/uL


(4.17-5.56) 





 


Mean Corpuscular Volume


 93.3 fL


(80.0-96.0) 





 


Mean Corpuscular Hemoglobin


 31.4 pg


(26.0-33.0) 





 


Mean Corpuscular Hemoglobin


Concent 33.6 g/dL


(32.0-36.0) 





 


Red Cell Distribution Width


 15.7 %


(11.5-14.5) 





 


Mean Platelet Volume


 6.7 fL


(7.2-11.1) 





 


Neutrophils (%) (Auto)


 76.8 %


(39.4-72.5) 





 


Lymphocytes (%) (Auto)


 15.7 %


(17.6-49.6) 





 


Monocytes (%) (Auto)


 6.5 %


(4.1-12.4) 





 


Eosinophils (%) (Auto)


 0.5 %


(0.4-6.7) 





 


Basophils (%) (Auto)


 0.5 %


(0.3-1.4) 





 


Nucleated RBC Relative Count


(auto) 0.1 /100WBC 


 





 


Neutrophils # (Auto)


 8.2 K/uL


(2.0-7.4) 





 


Lymphocytes # (Auto)


 1.7 K/uL


(1.3-3.6) 





 


Monocytes # (Auto)


 0.7 K/uL


(0.3-1.0) 





 


Eosinophils # (Auto)


 0.1 K/uL


(0.0-0.5) 





 


Basophils # (Auto)


 0.0 K/uL


(0.0-0.1) 





 


Nucleated RBC Absolute Count


(auto) 0.01 K/uL 


 





 


Sodium Level


 137 mmol/L


(137-145) 





 


Potassium Level


 4.1 mmol/L


(3.5-5.0) 





 


Chloride Level


 105 mmol/L


() 





 


Carbon Dioxide Level


 22 mmol/L


(22-31) 





 


Blood Urea Nitrogen


 41 mg/dl


(7-18) 





 


Creatinine


 0.90 mg/dl


(0.52-1.04) 





 


Glomerular Filtration Rate


Calc 59.4 


 





 


Random Glucose


 106 mg/dl


() 





 


Calcium Level


 8.9 mg/dl


(8.4-10.2) 





 


Total Bilirubin


 0.9 mg/dl


(0.2-1.3) 





 


Aspartate Amino Transf


(AST/SGOT) 17 U/L (0-35) 


 





 


Alanine Aminotransferase


(ALT/SGPT) 25 U/L (0-56) 


 





 


Alkaline Phosphatase 55 U/L (0-126)  


 


Troponin I < 0.012 ng/ml  


 


Total Protein


 6.1 g/dl


(6.3-8.2) 





 


Albumin


 3.5 g/dl


(3.5-5.0) 





 


Lipase


 42 U/L


() 





 


Urine Color  Yellow 


 


Urine Clarity  Clear 


 


Urine pH


 


 6.0 pH


(4.8-9.5)


 


Urine Specific Gravity  1.020 


 


Urine Protein


 


 Negative mg/dL


(NEGATIVE)


 


Urine Glucose (UA)


 


 Negative mg/dL


(NEGATIVE)


 


Urine Ketones


 


 20 mg/dL


(NEGATIVE)


 


Urine Blood


 


 Negative


(NEGATIVE)


 


Urine Nitrite


 


 Negative


(NEGATIVE)


 


Urine Bilirubin


 


 Negative


(NEGATIVE)


 


Urine Urobilinogen


 


 2.0 mg/dL


(0.2-1.9)


 


Urine Leukocyte Esterase


 


 Trace


(NEGATIVE)


 


Urine RBC


 


 1 /HPF


(0-2/HPF)


 


Urine WBC


 


 1 /HPF


(0-5/HPF)


 


Urine Squamous Epithelial


Cells 


 Many /LPF


(</=FEW)


 


Urine Transitional Epithelial


Cells 


 Few /LPF


(NONE-FEW)


 


Urine Bacteria


 


 Few /HPF


(NONE-FEW)


 


Urine Mucus


 


 Few /HPF


(NONE-FEW)








Chemistry








Test


 11/15/18


12:56 11/15/18


14:10


 


White Blood Count


 10.6 k/uL


(4.5-11.0) 





 


Red Blood Count


 4.01 M/uL


(4.17-5.56) 





 


Hemoglobin


 12.6 g/dL


(12.0-16.0) 





 


Hematocrit


 37.4 %


(34.0-47.0) 





 


Mean Corpuscular Volume


 93.3 fL


(80.0-96.0) 





 


Mean Corpuscular Hemoglobin


 31.4 pg


(26.0-33.0) 





 


Mean Corpuscular Hemoglobin


Concent 33.6 g/dL


(32.0-36.0) 





 


Red Cell Distribution Width


 15.7 %


(11.5-14.5) 





 


Platelet Count


 322 K/uL


(150-450) 





 


Mean Platelet Volume


 6.7 fL


(7.2-11.1) 





 


Neutrophils (%) (Auto)


 76.8 %


(39.4-72.5) 





 


Lymphocytes (%) (Auto)


 15.7 %


(17.6-49.6) 





 


Monocytes (%) (Auto)


 6.5 %


(4.1-12.4) 





 


Eosinophils (%) (Auto)


 0.5 %


(0.4-6.7) 





 


Basophils (%) (Auto)


 0.5 %


(0.3-1.4) 





 


Nucleated RBC Relative Count


(auto) 0.1 /100WBC 


 





 


Neutrophils # (Auto)


 8.2 K/uL


(2.0-7.4) 





 


Lymphocytes # (Auto)


 1.7 K/uL


(1.3-3.6) 





 


Monocytes # (Auto)


 0.7 K/uL


(0.3-1.0) 





 


Eosinophils # (Auto)


 0.1 K/uL


(0.0-0.5) 





 


Basophils # (Auto)


 0.0 K/uL


(0.0-0.1) 





 


Nucleated RBC Absolute Count


(auto) 0.01 K/uL 


 





 


Glomerular Filtration Rate


Calc 59.4 


 





 


Calcium Level


 8.9 mg/dl


(8.4-10.2) 





 


Total Bilirubin


 0.9 mg/dl


(0.2-1.3) 





 


Aspartate Amino Transf


(AST/SGOT) 17 U/L (0-35) 


 





 


Alanine Aminotransferase


(ALT/SGPT) 25 U/L (0-56) 


 





 


Alkaline Phosphatase 55 U/L (0-126)  


 


Troponin I < 0.012 ng/ml  


 


Total Protein


 6.1 g/dl


(6.3-8.2) 





 


Albumin


 3.5 g/dl


(3.5-5.0) 





 


Lipase


 42 U/L


() 





 


Urine Color  Yellow 


 


Urine Clarity  Clear 


 


Urine pH


 


 6.0 pH


(4.8-9.5)


 


Urine Specific Gravity  1.020 


 


Urine Protein


 


 Negative mg/dL


(NEGATIVE)


 


Urine Glucose (UA)


 


 Negative mg/dL


(NEGATIVE)


 


Urine Ketones


 


 20 mg/dL


(NEGATIVE)


 


Urine Blood


 


 Negative


(NEGATIVE)


 


Urine Nitrite


 


 Negative


(NEGATIVE)


 


Urine Bilirubin


 


 Negative


(NEGATIVE)


 


Urine Urobilinogen


 


 2.0 mg/dL


(0.2-1.9)


 


Urine Leukocyte Esterase


 


 Trace


(NEGATIVE)


 


Urine RBC


 


 1 /HPF


(0-2/HPF)


 


Urine WBC


 


 1 /HPF


(0-5/HPF)


 


Urine Squamous Epithelial


Cells 


 Many /LPF


(</=FEW)


 


Urine Transitional Epithelial


Cells 


 Few /LPF


(NONE-FEW)


 


Urine Bacteria


 


 Few /HPF


(NONE-FEW)


 


Urine Mucus


 


 Few /HPF


(NONE-FEW)








Urinalysis








Test


 11/15/18


14:10


 


Urine Color Yellow 


 


Urine Clarity Clear 


 


Urine pH


 6.0 pH


(4.8-9.5)


 


Urine Specific Gravity 1.020 


 


Urine Protein


 Negative mg/dL


(NEGATIVE)


 


Urine Glucose (UA)


 Negative mg/dL


(NEGATIVE)


 


Urine Ketones


 20 mg/dL


(NEGATIVE)


 


Urine Blood


 Negative


(NEGATIVE)


 


Urine Nitrite


 Negative


(NEGATIVE)


 


Urine Bilirubin


 Negative


(NEGATIVE)


 


Urine Urobilinogen


 2.0 mg/dL


(0.2-1.9)


 


Urine Leukocyte Esterase


 Trace


(NEGATIVE)


 


Urine RBC


 1 /HPF


(0-2/HPF)


 


Urine WBC


 1 /HPF


(0-5/HPF)


 


Urine Squamous Epithelial


Cells Many /LPF


(</=FEW)


 


Urine Transitional Epithelial


Cells Few /LPF


(NONE-FEW)


 


Urine Bacteria


 Few /HPF


(NONE-FEW)


 


Urine Mucus


 Few /HPF


(NONE-FEW)











EKG/Imaging


EKG Interpretation


12 lead EKG:


      Rhythm: sinus tachycardia


      Axis: normal 


      QRS: normal


      ST segments: normal


      sinus tachycardia. No stemi


Monitor Interpretation:  Sinus Tachycardia





ED Course/Re-evaluation


ED Course


Pt improves in ED; tolerates po, symptoms resolve. Rpt exam shows no guarding, 


peritoneal sgs. Pt comfortable and wishes to leave; I discussed monitoring, 


further eval, though symptoms most c/w gastritis of unk etiology.[place]inda PO without 


difficulty. Pt agrees to rtn for worsening symptoms or any concerns.


Decision to Disposition Date:  Nov 15, 2018


Decision to Disposition Time:  14:42





Depart


Departure


Latest Vital Signs





Vital Signs








  Date Time  Temp Pulse Resp B/P (MAP) Pulse Ox O2 Delivery O2 Flow Rate FiO2


 


11/15/18 14:03  ???   91   


 


11/15/18 14:00    111/75 (87)    


 


11/15/18 13:07 98.7  14   Room Air  








Impression:  


   Primary Impression:  


   Vomiting


Condition:  Improved


Disposition:  HOME OR SELF-CARE


Referrals:  


CANDIS CANALES (PCP)


New Scripts


Ondansetron Hcl (ZOFRAN) 4 Mg Tablet


4 MG PO Q8H for Nausea, #10 TAB


   Prov: KRISTEN KAHN MD         11/15/18


Patient Instructions:  Acute Nausea and Vomiting (ED)





Additional Instructions:  


As we discussed, please return immediately or at any time for any concerning 


pain, inability to tolerate fluids, or any concerns.





Problem Qualifiers








   Primary Impression:  


   Vomiting


   Vomiting type:  unspecified  Vomiting Intractability:  non-intractable  


   Nausea presence:  with nausea  Qualified Codes:  R11.2 - Nausea with 


   vomiting, unspecified








KRISTEN KAHN MD                Nov 15, 2018 13:40

## 2018-11-15 NOTE — HOSPITALIST CONSULTATION
History of Present Illness


Requesting Physician


Ullrich


Reason for Consult


atrial fibrillation


History of Present Illness


87yo female with atrial fibrillation, and endometrial cancer who came to the ER 


for nausea and vomiting.  The vomiting started about 1030am.  It was initially 


clear vomitus, but turned dark.  She denies cp/sob.  She has some epigastric 


discomfort. Since coming to the floor, she has been vomiting up dark blood about


300-400cc total.





History


Problems:  


(1) Atrial fibrillation


Status:  Chronic


(2) Adenocarcinoma of endometrium, stage 3


Status:  Chronic


(3) Hypertension


Status:  Chronic


(4) Melanoma


Status:  Resolved


(5) Glaucoma


Status:  Chronic


(6) Cataracts, bilateral


Status:  Resolved


Home Meds


Active Scripts


Ondansetron Hcl (ZOFRAN) 4 Mg Tablet, 4 MG PO Q8H for Nausea, #10 TAB


   Prov:KRISTEN KAHN MD         11/15/18


Reported Medications


Gabapentin (GABAPENTIN) 300 Mg Capsule, 600 MG PO BID, CAPSULE


   4/12/18


Amiodarone HCl/D5w (Amiodarone 150 mg/100 ml-D5w) 150 Mg/100 Ml (1.5 Mg/Ml) 


Plast..bag, 200 MG PO BID


   4/12/18


Apixaban (ELIQUIS) 5 Mg Tablet, 5 MG PO


   2/2/18


Pyridoxine Hcl (VITAMIN B-6) 25 Mg Tablet, 25 MG PO DAILY


   11/30/17


Cyanocobalamin (Vitamin B-12) (VITAMIN B-12) 1,000 Mcg Tablet, 1000 MCG PO DAILY


   11/30/17


Calcium Carbonate (CALCIUM) 500 Mg Tab.chew, 500 MG PO BID, TAB.CHEW


   11/24/16


Allergies:  


Coded Allergies:  


     No Known Drug Allergies (Unverified , 11/15/18)


Patient History:  


FH: breast cancer


  MOTHER, Onset:68


FH: colon cancer


  GRANDFATHER, Onset:70


Other Social/Family Hx


She has 2 girls.  No tobacco use.


Hx Smoking:  No


Smoking Status:  Never Smoker


Exposure to Second Hand Smoke?:  Yes


Caffeine Intake:  Coffee, Tea, Soda


Caffeine/Cups Per Day:  2


Hx Alcohol Use:  Yes (nando before bedtime)


Hx Substance Use Disorder:  No


History of IV Drug Use:  No





Review of Systems


All Systems Reviewed/Normal:  Yes, Except as Noted





Exam


Vital Signs





Vital Signs








  Date Time  Temp Pulse Resp B/P (MAP) Pulse Ox O2 Delivery O2 Flow Rate FiO2


 


11/15/18 19:23  131 24 112/78 (89) 95 Room Air  


 


11/15/18 18:03 99.6       








General Appearance:  Alert, Awake, Other (Pale.  Looks a bit uncomfortable)


Neuro:  No Gross deficits


Cardiovascular:  Other (Tachy, regular, no m/r/g)


Respiratory:  Clear to Auscultation


GI:  Other (Soft, non-distended.  Mild discomfort with epigastric pressure)


Extremities:  Other (R leg is bigger than the L chronically.  No pitting edema)


Integumentary:  No Jaundice, No Cyanosis





Medical Decision Making


Data Points


Result Diagram:  


11/15/18 1256                                                                   


            11/15/18 1256














Item Value  Date Time


 


Aspartate Amino Transf (AST/SGOT) 17 U/L 11/15/18 1256


 


Alanine Aminotransferase (ALT/SGPT) 25 U/L 11/15/18 1256


 


Alkaline Phosphatase 55 U/L 11/15/18 1256


 


Troponin I < 0.012 ng/ml 11/15/18 1256


 


Neutrophils (%) (Auto) 76.8 % H 11/15/18 1256


 


Lymphocytes (%) (Auto) 15.7 % L 11/15/18 1256


 


Monocytes (%) (Auto) 6.5 % 11/15/18 1256


 


Eosinophils (%) (Auto) 0.5 % 11/15/18 1256


 


Basophils (%) (Auto) 0.5 % 11/15/18 1256


 


Urine Leukocyte Esterase Trace H 11/15/18 1410


 


Urine RBC 1 /HPF 11/15/18 1410


 


Urine WBC 1 /HPF 11/15/18 1410


 


Urine Squamous Epithelial Cells Many /LPF H 11/15/18 1410


 


Urine Transitional Epithelial Cells Few /LPF 11/15/18 1410











EKG / Imaging


EKG Interpretation


Vent. Rate : 103 BPM     Atrial Rate : 103 BPM


   P-R Int : 150 ms          QRS Dur : 076 ms


    QT Int : 354 ms       P-R-T Axes : 031 020 067 degrees


   QTc Int : 463 ms


 


Sinus tachycardia


R wave progression consistent with an old ant/sep MI vs lead placement


No ST-T abnormalities


Relatively unchanged from previous


 


Confirmed by JOS JOHNSON ([id number]) on 11/15/2018 6:26:57 PM


Imaging


Abd/Pelvis CT - 1.  There is a near-circumferential mass involving the lower 


second segment of the duodenum producing partial obstruction to the duodenal 


bulb and stomach, highly concerning for primary adenocarcinoma.  There is 


probable early upper abdominal lymphadenopathy metastatic disease as above.  No 


hepatic lesions are visible.


2.  Otherwise expected age-related changes.





Head CT - Age-appropriate changes. No evidence of mass, acute ischemia or 


hemorrhage.





CXR - No acute cardiopulmonary abnormality identified.





Assessment and Plan


Problems:  


(1) Duodenal obstruction


Status:  Acute


Assessment & Plan:  She presented with acute onset of n/v that has turned to 


hematemesis.  By CT there is partial obstruction of the second portion of the 


duodenum secondary to a near-circumferential mass that is worrisome for 


adenocarcinoma.  See Dr. Ullrich's note for details.  Certainly, Eliquis is 


contributing to the hematemesis, which will be stopped.  Will start Protonix IV 


for acid suppression.  The patient is a DNR/DNI.  





(2) Atrial fibrillation


Status:  Chronic


Assessment & Plan:  She is currently in a sinus rhythm.  She takes amiodarone 


200mg M/W/F.  It will be held for now and will follow rate.





(3) Glaucoma


Status:  Chronic


Assessment & Plan:  Continue chronic Lumigan and Lotemax eye drops.





(4) Peripheral neuropathy


Status:  Chronic


Assessment & Plan:  She is chronically on gabapentin.  Will hold for now 


secondary to the n/v.





(5) Adenocarcinoma of endometrium, stage 3


Status:  Chronic


Assessment & Plan:  Followed by Dr. Burrows.  The patient received adjuvant 


chemotherapy with carboplatin and Taxol for six cycles, completed January 8, 2015.  She received also adjuvant radiation therapy with external beam radiation


therapy and intracavitary seed implant.  She is currently in remission.  





Copies to:   MYESHA BURROWS MD; CANDIS CANALES; ULLRICH,JOHN A MD ;





Venous Thromboembolism


Antithrombotics


Is Pt On Any Antithrombotics?:  No





Exam


Sepsis Risk:  No Definite Risk











JOS JOHNSON MD               Nov 15, 2018 20:13

## 2018-11-16 VITALS — SYSTOLIC BLOOD PRESSURE: 106 MMHG | DIASTOLIC BLOOD PRESSURE: 48 MMHG

## 2018-11-16 VITALS — DIASTOLIC BLOOD PRESSURE: 57 MMHG | SYSTOLIC BLOOD PRESSURE: 115 MMHG

## 2018-11-16 VITALS — DIASTOLIC BLOOD PRESSURE: 57 MMHG | SYSTOLIC BLOOD PRESSURE: 120 MMHG

## 2018-11-16 VITALS — SYSTOLIC BLOOD PRESSURE: 111 MMHG | DIASTOLIC BLOOD PRESSURE: 62 MMHG

## 2018-11-16 VITALS — SYSTOLIC BLOOD PRESSURE: 112 MMHG | DIASTOLIC BLOOD PRESSURE: 57 MMHG

## 2018-11-16 VITALS — DIASTOLIC BLOOD PRESSURE: 62 MMHG | SYSTOLIC BLOOD PRESSURE: 119 MMHG

## 2018-11-16 VITALS — SYSTOLIC BLOOD PRESSURE: 121 MMHG | DIASTOLIC BLOOD PRESSURE: 76 MMHG

## 2018-11-16 VITALS — DIASTOLIC BLOOD PRESSURE: 56 MMHG | SYSTOLIC BLOOD PRESSURE: 113 MMHG

## 2018-11-16 VITALS — SYSTOLIC BLOOD PRESSURE: 127 MMHG | DIASTOLIC BLOOD PRESSURE: 59 MMHG

## 2018-11-16 VITALS — DIASTOLIC BLOOD PRESSURE: 68 MMHG | SYSTOLIC BLOOD PRESSURE: 121 MMHG

## 2018-11-16 VITALS — DIASTOLIC BLOOD PRESSURE: 68 MMHG | SYSTOLIC BLOOD PRESSURE: 117 MMHG

## 2018-11-16 VITALS — DIASTOLIC BLOOD PRESSURE: 59 MMHG | SYSTOLIC BLOOD PRESSURE: 110 MMHG

## 2018-11-16 LAB — PLATELET COUNT, AUTOMATED: 209 K/UL (ref 150–450)

## 2018-11-16 PROCEDURE — 30233N1 TRANSFUSION OF NONAUTOLOGOUS RED BLOOD CELLS INTO PERIPHERAL VEIN, PERCUTANEOUS APPROACH: ICD-10-PCS | Performed by: SURGERY

## 2018-11-16 RX ADMIN — LOTEPREDNOL ETABONATE SCH ML: 5 SUSPENSION/ DROPS OPHTHALMIC at 09:15

## 2018-11-16 RX ADMIN — PANTOPRAZOLE SODIUM SCH MLS/HR: 40 INJECTION, POWDER, FOR SOLUTION INTRAVENOUS at 08:44

## 2018-11-16 RX ADMIN — LOTEPREDNOL ETABONATE SCH ML: 5 SUSPENSION/ DROPS OPHTHALMIC at 21:00

## 2018-11-16 RX ADMIN — PANTOPRAZOLE SODIUM SCH MLS/HR: 40 INJECTION, POWDER, FOR SOLUTION INTRAVENOUS at 18:12

## 2018-11-16 RX ADMIN — THIAMINE HYDROCHLORIDE PRN MLS/HR: 100 INJECTION, SOLUTION INTRAMUSCULAR; INTRAVENOUS at 18:10

## 2018-11-16 RX ADMIN — ACETAMINOPHEN PRN MLS/HR: 10 INJECTION, SOLUTION INTRAVENOUS at 18:10

## 2018-11-16 RX ADMIN — BIMATOPROST SCH ML: 0.1 SOLUTION/ DROPS OPHTHALMIC at 21:00

## 2018-11-16 RX ADMIN — THIAMINE HYDROCHLORIDE PRN MLS/HR: 100 INJECTION, SOLUTION INTRAMUSCULAR; INTRAVENOUS at 17:16

## 2018-11-16 RX ADMIN — THIAMINE HYDROCHLORIDE PRN MLS/HR: 100 INJECTION, SOLUTION INTRAMUSCULAR; INTRAVENOUS at 06:12

## 2018-11-16 NOTE — HOSPITALIST PROGRESS NOTE
Subjective


Progress Notes


Subjective


86F admitted for SBO. Sleeping comfortably this am, staff without new concerns. 


Receiving PRBC 2U had some hematemesis and hematochezia overnight.





Physical Exam





Vital Signs








  Date Time  Temp Pulse Resp B/P (MAP) Pulse Ox O2 Delivery O2 Flow Rate FiO2


 


11/16/18 13:18 99.6 86 18 115/57    


 


11/16/18 10:30     100 Nasal Cannula 1.5 














Intake and Output 


 


 11/16/18





 07:00


 


Intake Total 1500 ml


 


Output Total 1000 ml


 


Balance 500 ml


 


 


 


Intake IV Total 1500 ml


 


Output Emesis 1000 ml


 


# Voids 3


 


# Bowel Movements 2


 


# Emeses 3








General Appearance:  No Acute Distress


ENT:  Normal


Cardiovascular:  Normal Rhythm & Peripheral Pulses


Respiratory:  No Respiratory Distress


GI:  Soft and Non-Tender


Extremities:  Soft and Non Tender, Warm, Pulses, Perfused


Result Diagram:  


11/16/18 0549                                                                   


            11/16/18 0549





Monitor Interpretation:  Sinus Tachycardia





Assessment and Plan


Problems:  


(1) Duodenal obstruction


Status:  Acute


Assessment & Plan:  She presented with acute onset of n/v that turned to 


hematemesis.  By CT there is partial obstruction of the second portion of the 


duodenum secondary to a near-circumferential mass that is worrisome for 


adenocarcinoma.  See Dr. Ullrich's note for details.  Eliquis stopped.  Protonix


IV for acid suppression.  The patient is a DNR/DNI. Plan for endoscopy with 


biopsies tomorrow if bleeding resolved.





(2) Atrial fibrillation


Status:  Chronic


Assessment & Plan:  She is currently in a sinus rhythm.  She takes amiodarone 


200mg M/W/F.  It will be held for now and will follow rate.





(3) Glaucoma


Status:  Chronic


Assessment & Plan:  Continue chronic Lumigan and Lotemax eye drops.





(4) Peripheral neuropathy


Status:  Chronic


Assessment & Plan:  She is chronically on gabapentin.  Will hold for now 


secondary to the n/v.





(5) Adenocarcinoma of endometrium, stage 3


Status:  Chronic


Assessment & Plan:  Followed by Dr. Perez.  The patient received adjuvant 


chemotherapy with carboplatin and Taxol for six cycles, completed January 8, 2015.  She received also adjuvant radiation therapy with external beam radiation


therapy and intracavitary seed implant.  She is currently in remission.  








Exam


Sepsis Risk:  No Definite Risk











MCKEON LACEY KEATING DO       Nov 16, 2018 13:33

## 2018-11-16 NOTE — MEDICAL NUTRITION THERAPY
Nutrition Anthropometrics


Height (Inches):  65.00


Height (Calculated Centimeters:  165.952851


Weight (Pounds):  155


Weight (Calculated Kilograms):  70.307


BMI:  25.8


Poncho Nutrition Score:         Probably Inadequate 


Poncho Nutrition Risk Score:  17


Dietary Referral


Nutrition Risk Factors:      


Nutrition Risk Comment:





Physical Findings


Physical Appearance:  Overweight BMI 25-29


Skin Appearance


Skin Appearance:


Edema


Edema Location Modifier: Right 


Edema Location:              Lower Extremity 


Type of Edema:                 


Degree of Edema:            2+


Gastrointestinal Symptoms


GI Symtoms:                Nausea, Vomiting 


Tube Present:               


Bowel Sounds:              


Recent Bowel Pattern:    


Stool Characteristics:





Nutritional Diagnosis


Nutritional Risk Acuity 1:  GI Obstruction


Nutritional Risk Acuity 3:  OR & > 80 yrs, Nausea, GI Bleed


Past Medical History:  


endometrial cancer, pancreatitis, a-fib, glaucoma, HTN


Nutritional Acuity:  1-High


Nutrition Diagnosis:  Altered GI Function


Nutrition Etiology:  Physiological Causes


Nutrition Problem/Etiology/Sym:  


Altered GI function r/t physiological causes AEB duodenal obstruction, GI


bleeding, vomitting blood, adb pain.


Energy Requirement:  1250 (M-SJ*1.1)


Protein Requirement:  80 (1.1g/kg)


Fluid Requirement:  2100 (30ml/kg)


Diet Type:  NPO (Nothing by Mouth)


Nutrition Intervention:  Incr diet as tolerated





Nutrition Monitoring & Eval


Nutrition Goals:  Eat % Meal, Drink > 1500 cc/day


RD Patient Assessment Time:  30 minutes


RD Assessment Type:  RD Assessment


Patient Nutrition Acuity:  1-High


Follow Up Date:  Nov 18, 2018


Nutritional Comment:  


11/16 Pt admitted for duodenal obstruction with n/v. On floor pt vomitting


dark blood, 300-400cc in total. Pt NPO. Hx of endometrial cancer,


pancreatitis, a-fib, glaucoma, HTN. Pt hbg 6.4, Hct 19.2, , BUN 46.


If obstruction is cancer, pt does not want surgery and/or chemo, but


comfort care. Pt is high nutrition risk. Will follow. CORNELL ZAMORA                    Nov 16, 2018 09:37

## 2018-11-16 NOTE — GENERAL SURGERY PROGRESS NOTE
Subjective


Progress Notes


Subjective


After admission, pt had several episodes of hematemesis and 2 melanotic stools 


overnight.  Last hematemesis was before 2200 last night and last melanotic stool


was at about 0100 this morning.  Feels better this morning.





Physical Exam





Vital Signs








  Date Time  Temp Pulse Resp B/P (MAP) Pulse Ox O2 Delivery O2 Flow Rate FiO2


 


11/16/18 04:30  92      


 


11/16/18 04:27 98.4  18 127/59 (81) 99 Nasal Cannula 2.0 














Intake and Output 


 


 11/16/18





 07:00


 


Intake Total 1500 ml


 


Output Total 1000 ml


 


Balance 500 ml


 


 


 


Intake IV Total 1500 ml


 


Output Emesis 1000 ml


 


# Voids 3


 


# Bowel Movements 2


 


# Emeses 3








General Appearance:  Alert, Awake, No Acute Distress, Afebrile


GI:  Other (Mild epigastric TTP)


Extremities:  Warm, Perfused


Result Diagram:  


11/16/18 0549                                                                   


            11/16/18 0549





Monitor Interpretation:  Sinus Tachycardia





Assessment and Plan


Problems:  


(1) Duodenal obstruction


Status:  Acute


Assessment & Plan:  11/15/18:  I have explained the CT findings to the patient 


in detail.  I have told her that there is a lesion in her duodenum that is 


suspicious for a cancer but this would need to be further evaluated 


endoscopically.  She immediately responded that if it is cancer, she does not 


wish to have any surgery or chemotherapy but just wishes to be made comfortable.


 I discussed the possibility of a feeding tube and she indicated that she is not


interested in any sort of feeding tube.  She is interested in and EGD to 


evaluate her duodenum.  Will admit her to Med/Surg, NPO, IV fluids, nausea and 


pain control.  Will ask the Dr. Byrne with the Hospitalist Service to consult


and help manage her comorbidities and assist with end of life planning if 


required.  Will hold off on her Eliquis and will plan on EGD with biopsies the 


day after tomorrow after her Eliquis has worn off.  I have explained this plan 


to her in detail and she is in agreement with this plan.





11/16/18:  Now having GI bleeding, likely from the duodenal lesion but could 


also be from other sources such as PUD.  Will change PPI to PPI gtt.  I did have


a long conversation this morning with the patient and she is agreeable with a 


blood transfusion so will give her 2 Units of pRBC and recheck her H/H, she will


likely require more than 2 Units of pRBC.  Will keep off NSAIDS and blood 


thinners.  Plan on EGD tomorrow morning.  She remains adamant that she does not 


wish to have surgery, even for GI bleeding.  Will follow her closely today, 


continue bowel rest, IV fluids.





Condition


Stable.


Time Spent:  < 30 min





Exam


Sepsis Risk:  No Definite Risk











ULLRICH,JOHN A MD              Nov 16, 2018 07:16

## 2018-11-17 VITALS — SYSTOLIC BLOOD PRESSURE: 132 MMHG | DIASTOLIC BLOOD PRESSURE: 70 MMHG

## 2018-11-17 VITALS — DIASTOLIC BLOOD PRESSURE: 58 MMHG | SYSTOLIC BLOOD PRESSURE: 114 MMHG

## 2018-11-17 VITALS — SYSTOLIC BLOOD PRESSURE: 113 MMHG | DIASTOLIC BLOOD PRESSURE: 56 MMHG

## 2018-11-17 VITALS — DIASTOLIC BLOOD PRESSURE: 44 MMHG | SYSTOLIC BLOOD PRESSURE: 95 MMHG

## 2018-11-17 VITALS — DIASTOLIC BLOOD PRESSURE: 59 MMHG | SYSTOLIC BLOOD PRESSURE: 123 MMHG

## 2018-11-17 VITALS — SYSTOLIC BLOOD PRESSURE: 129 MMHG | DIASTOLIC BLOOD PRESSURE: 65 MMHG

## 2018-11-17 VITALS — SYSTOLIC BLOOD PRESSURE: 136 MMHG | DIASTOLIC BLOOD PRESSURE: 83 MMHG

## 2018-11-17 VITALS — DIASTOLIC BLOOD PRESSURE: 83 MMHG | SYSTOLIC BLOOD PRESSURE: 123 MMHG

## 2018-11-17 VITALS — SYSTOLIC BLOOD PRESSURE: 94 MMHG | DIASTOLIC BLOOD PRESSURE: 46 MMHG

## 2018-11-17 VITALS — DIASTOLIC BLOOD PRESSURE: 66 MMHG | SYSTOLIC BLOOD PRESSURE: 115 MMHG

## 2018-11-17 VITALS — DIASTOLIC BLOOD PRESSURE: 62 MMHG | SYSTOLIC BLOOD PRESSURE: 122 MMHG

## 2018-11-17 LAB — PLATELET COUNT, AUTOMATED: 150 K/UL (ref 150–450)

## 2018-11-17 PROCEDURE — 0DB98ZX EXCISION OF DUODENUM, VIA NATURAL OR ARTIFICIAL OPENING ENDOSCOPIC, DIAGNOSTIC: ICD-10-PCS | Performed by: SURGERY

## 2018-11-17 RX ADMIN — SUCRALFATE SCH GM: 1 TABLET ORAL at 12:23

## 2018-11-17 RX ADMIN — BIMATOPROST SCH ML: 0.1 SOLUTION/ DROPS OPHTHALMIC at 20:24

## 2018-11-17 RX ADMIN — PANTOPRAZOLE SODIUM SCH MLS/HR: 40 INJECTION, POWDER, FOR SOLUTION INTRAVENOUS at 15:11

## 2018-11-17 RX ADMIN — GABAPENTIN SCH MG: 300 CAPSULE ORAL at 18:49

## 2018-11-17 RX ADMIN — LOTEPREDNOL ETABONATE SCH ML: 5 SUSPENSION/ DROPS OPHTHALMIC at 20:24

## 2018-11-17 RX ADMIN — ONDANSETRON HYDROCHLORIDE PRN MG: 2 INJECTION, SOLUTION INTRAMUSCULAR; INTRAVENOUS at 18:06

## 2018-11-17 RX ADMIN — SUCRALFATE SCH GM: 1 TABLET ORAL at 16:45

## 2018-11-17 RX ADMIN — LOTEPREDNOL ETABONATE SCH ML: 5 SUSPENSION/ DROPS OPHTHALMIC at 09:08

## 2018-11-17 RX ADMIN — PANTOPRAZOLE SODIUM SCH MLS/HR: 40 INJECTION, POWDER, FOR SOLUTION INTRAVENOUS at 23:30

## 2018-11-17 RX ADMIN — SUCRALFATE SCH GM: 1 TABLET ORAL at 20:23

## 2018-11-17 RX ADMIN — ACETAMINOPHEN PRN MLS/HR: 10 INJECTION, SOLUTION INTRAVENOUS at 02:29

## 2018-11-17 RX ADMIN — THIAMINE HYDROCHLORIDE PRN MLS/HR: 100 INJECTION, SOLUTION INTRAMUSCULAR; INTRAVENOUS at 04:31

## 2018-11-17 RX ADMIN — THIAMINE HYDROCHLORIDE PRN MLS/HR: 100 INJECTION, SOLUTION INTRAMUSCULAR; INTRAVENOUS at 19:25

## 2018-11-17 RX ADMIN — PANTOPRAZOLE SODIUM SCH MLS/HR: 40 INJECTION, POWDER, FOR SOLUTION INTRAVENOUS at 02:30

## 2018-11-17 NOTE — HOSPITALIST PROGRESS NOTE
Subjective


Progress Notes


Subjective


86F admitted for SBO with mass encircling duodenum. LESLY overnight, N/V much 


improved. To OR for endoscopic biopsy today.





Patient Complains of:


Gastrointestinal:  No Nausea, No Vomiting





Physical Exam





Vital Signs








  Date Time  Temp Pulse Resp B/P (MAP) Pulse Ox O2 Delivery O2 Flow Rate FiO2


 


11/17/18 03:24  65      


 


11/17/18 03:00 98.8       


 


11/17/18 02:20   18 122/62 (82) 99 Nasal Cannula 1.0 














Intake and Output 


 


 11/17/18





 06:59


 


Intake Total 3314 ml


 


Balance 3314 ml


 


 


 


Intake IV Total 2314 ml


 


Blood Product 1000 ml


 


# Voids 5








General Appearance:  Alert, Awake, No Acute Distress


Neuro:  No Gross deficits


Eyes:  PERRLA


ENT:  Normal


Cardiovascular:  Normal Rhythm & Peripheral Pulses


Respiratory:  No Respiratory Distress


GI:  Soft and Non-Tender


Musculoskeletal:  No Weakness/Pain


Extremities:  Soft and Non Tender, Warm, Pulses, Perfused; No Edema


Integumentary:  Skin Intact without Lesion / Mass


Psych:  Alert & Oriented X3


Result Diagram:  


11/16/18 1650                                                                   


            11/17/18 0550





Monitor Interpretation:  Sinus Tachycardia





Assessment and Plan


Problems:  


(1) Duodenal obstruction


Status:  Acute


Assessment & Plan:  She presented with acute onset of n/v that turned to 


hematemesis.  By CT there is partial obstruction of the second portion of the 


duodenum secondary to a near-circumferential mass that is worrisome for 


adenocarcinoma.  See Dr. Ullrich's note for details.  Eliquis stopped.  Protonix


IV for acid suppression.  The patient is a DNR/DNI. Plan for endoscopy with 


biopsies today. Currently she does not want to pursue aggressive therapies.





(2) Atrial fibrillation


Status:  Chronic


Assessment & Plan:  She is currently in a sinus rhythm.  She takes amiodarone 


200mg M/W/F.  It will be held for now and will follow rate.





(3) Glaucoma


Status:  Chronic


Assessment & Plan:  Continue chronic Lumigan and Lotemax eye drops.





(4) Peripheral neuropathy


Status:  Chronic


Assessment & Plan:  She is chronically on gabapentin.  Resume gabapentin.





(5) Adenocarcinoma of endometrium, stage 3


Status:  Chronic


Assessment & Plan:  Followed by Dr. Perez.  The patient received adjuvant 


chemotherapy with carboplatin and Taxol for six cycles, completed January 8, 2015.  She received also adjuvant radiation therapy with external beam radiation


therapy and intracavitary seed implant.  She is currently in remission.  








Exam


Sepsis Risk:  No Definite Risk











MCKEON LACEY KEATING DO       Nov 17, 2018 06:20

## 2018-11-17 NOTE — GENERAL SURGERY PROGRESS NOTE
Subjective


Progress Notes


Subjective


No complaints.  No pain.  No N/V, diarrhea, or constipation.  No further 


hematemesis or blood in stools.





Physical Exam





Vital Signs








  Date Time  Temp Pulse Resp B/P (MAP) Pulse Ox O2 Delivery O2 Flow Rate FiO2


 


11/17/18 07:51     98 Nasal Cannula 1.5 


 


11/17/18 07:24 98.3 84 17 129/65 (86)    














Intake and Output 


 


 11/17/18





 07:00


 


Intake Total 3314 ml


 


Balance 3314 ml


 


 


 


Intake IV Total 2314 ml


 


Blood Product 1000 ml


 


# Voids 6








General Appearance:  Alert, Awake, No Acute Distress, Afebrile


Chest:  Other (Left chest Power Port inspected, no erythema or drainage.  I 


don't feel any swelling particularly although this was reported after an 


infusion this morning.  Exam is limited by a translucent dressing as the port is


currently accessed.)


GI:  Soft and Non-Tender


Extremities:  Warm, Perfused


Result Diagram:  


11/17/18 0550                                                                   


            11/17/18 0550





Monitor Interpretation:  Sinus Tachycardia





Assessment and Plan


Problems:  


(1) Duodenal obstruction


Status:  Acute


Assessment & Plan:  11/15/18:  I have explained the CT findings to the patient 


in detail.  I have told her that there is a lesion in her duodenum that is 


suspicious for a cancer but this would need to be further evaluated 


endoscopically.  She immediately responded that if it is cancer, she does not 


wish to have any surgery or chemotherapy but just wishes to be made comfortable.


 I discussed the possibility of a feeding tube and she indicated that she is not


interested in any sort of feeding tube.  She is interested in and EGD to 


evaluate her duodenum.  Will admit her to Med/Surg, NPO, IV fluids, nausea and 


pain control.  Will ask the Dr. Byrne with the Hospitalist Service to consult


and help manage her comorbidities and assist with end of life planning if 


required.  Will hold off on her Eliquis and will plan on EGD with biopsies the 


day after tomorrow after her Eliquis has worn off.  I have explained this plan 


to her in detail and she is in agreement with this plan.





11/16/18:  Now having GI bleeding, likely from the duodenal lesion but could 


also be from other sources such as PUD.  Will change PPI to PPI gtt.  I did have


a long conversation this morning with the patient and she is agreeable with a 


blood transfusion so will give her 2 Units of pRBC and recheck her H/H, she will


likely require more than 2 Units of pRBC.  Will keep off NSAIDS and blood 


thinners.  Plan on EGD tomorrow morning.  She remains adamant that she does not 


wish to have surgery, even for GI bleeding.  Will follow her closely today, 


continue bowel rest, IV fluids.





11/17/18:  GI bleeding has stopped.  Good response to 2 Units of pRBC yesterday,


Hb down a little this morning to 8.6 but no other signs of active GI bleeding.  


Will follow H/H and will plan on transfusion if Hb less than 8.  Hold off on 


blood thinners/NSAIDS.  Will plan on EGD this morning.  Pt agreeable with 


proceeding with this plan.





(2) Upper GI bleeding


Status:  Resolved


(3) Anemia


Status:  Acute


Condition


Stable.


Time Spent:  < 30 min





Exam


Sepsis Risk:  No Definite Risk





Problem Qualifiers





(1) Anemia:  


Anemia type:  other cause  Other causes of anemia:  acute posthemorrhagic  


Qualified Codes:  D62 - Acute posthemorrhagic anemia








ULLRICH,JOHN A MD              Nov 17, 2018 08:57

## 2018-11-18 VITALS — DIASTOLIC BLOOD PRESSURE: 53 MMHG | SYSTOLIC BLOOD PRESSURE: 105 MMHG

## 2018-11-18 VITALS — DIASTOLIC BLOOD PRESSURE: 58 MMHG | SYSTOLIC BLOOD PRESSURE: 112 MMHG

## 2018-11-18 VITALS — DIASTOLIC BLOOD PRESSURE: 54 MMHG | SYSTOLIC BLOOD PRESSURE: 103 MMHG

## 2018-11-18 VITALS — DIASTOLIC BLOOD PRESSURE: 64 MMHG | SYSTOLIC BLOOD PRESSURE: 123 MMHG

## 2018-11-18 VITALS — SYSTOLIC BLOOD PRESSURE: 91 MMHG | DIASTOLIC BLOOD PRESSURE: 52 MMHG

## 2018-11-18 VITALS — DIASTOLIC BLOOD PRESSURE: 71 MMHG | SYSTOLIC BLOOD PRESSURE: 121 MMHG

## 2018-11-18 VITALS — SYSTOLIC BLOOD PRESSURE: 124 MMHG | DIASTOLIC BLOOD PRESSURE: 76 MMHG

## 2018-11-18 VITALS — DIASTOLIC BLOOD PRESSURE: 60 MMHG | SYSTOLIC BLOOD PRESSURE: 105 MMHG

## 2018-11-18 LAB — PLATELET COUNT, AUTOMATED: 159 K/UL (ref 150–450)

## 2018-11-18 RX ADMIN — LOTEPREDNOL ETABONATE SCH ML: 5 SUSPENSION/ DROPS OPHTHALMIC at 08:35

## 2018-11-18 RX ADMIN — PANTOPRAZOLE SODIUM SCH MLS/HR: 40 INJECTION, POWDER, FOR SOLUTION INTRAVENOUS at 10:00

## 2018-11-18 RX ADMIN — THIAMINE HYDROCHLORIDE PRN MLS/HR: 100 INJECTION, SOLUTION INTRAMUSCULAR; INTRAVENOUS at 05:11

## 2018-11-18 RX ADMIN — SODIUM CHLORIDE ONE MLS/HR: 900 INJECTION, SOLUTION INTRAVENOUS at 11:32

## 2018-11-18 RX ADMIN — SUCRALFATE SCH GM: 1 TABLET ORAL at 15:37

## 2018-11-18 RX ADMIN — BIMATOPROST SCH ML: 0.1 SOLUTION/ DROPS OPHTHALMIC at 21:00

## 2018-11-18 RX ADMIN — SODIUM CHLORIDE ONE MLS/HR: 900 INJECTION, SOLUTION INTRAVENOUS at 13:58

## 2018-11-18 RX ADMIN — SUCRALFATE SCH GM: 1 TABLET ORAL at 11:32

## 2018-11-18 RX ADMIN — SUCRALFATE SCH GM: 1 TABLET ORAL at 06:09

## 2018-11-18 RX ADMIN — LOTEPREDNOL ETABONATE SCH ML: 5 SUSPENSION/ DROPS OPHTHALMIC at 21:00

## 2018-11-18 RX ADMIN — GABAPENTIN SCH MG: 300 CAPSULE ORAL at 21:55

## 2018-11-18 RX ADMIN — GABAPENTIN SCH MG: 300 CAPSULE ORAL at 08:33

## 2018-11-18 RX ADMIN — LOTEPREDNOL ETABONATE SCH ML: 5 SUSPENSION/ DROPS OPHTHALMIC at 09:00

## 2018-11-18 RX ADMIN — SUCRALFATE SCH GM: 1 TABLET ORAL at 21:55

## 2018-11-18 NOTE — HOSPITALIST PROGRESS NOTE
Subjective


Progress Notes


Subjective


She reports feeling "much better than I did a few days ago". No black/bloody 


stools. Appetite returning.





Physical Exam





Vital Signs








  Date Time  Temp Pulse Resp B/P (MAP) Pulse Ox O2 Delivery O2 Flow Rate FiO2


 


11/18/18 07:18 99.0 77  105/53 (70) 96 Room Air  


 


11/18/18 03:15       0.5 


 


11/17/18 15:08   18     














Intake and Output 


 


 11/18/18





 06:59


 


Intake Total 3508 ml


 


Output Total 5 ml


 


Balance 3503 ml


 


 


 


Intake Oral 320 ml


 


IV Total 3188 ml


 


Output Emesis 5 ml


 


# Voids 8


 


# Bowel Movements 1


 


# Emeses 3








General Appearance:  Alert, Awake


Cardiovascular:  Regular Rate and Rhythm (distant tones)


Respiratory:  Clear to Auscultation


GI:  Soft and Non-Tender


Extremities:  Warm, Perfused, Edema (trace right foot/ankle)


Psych:  Alert & Oriented X3


Result Diagram:  


11/18/18 0657                                                                   


            11/18/18 0500





Monitor Interpretation:  Normal Sinus Rhythm





Assessment and Plan


Problems:  


(1) Duodenal obstruction


Status:  Acute


Assessment & Plan:  She presented with acute onset of N/V with hematemesis.  By 


CT there appeared to be partial obstruction of the second portion of the 


duodenum secondary to a near-circumferential mass that was worrisome for 


adenocarcinoma.  Dr. Ullrich performed EGD yesterday, which showed ulcers. She 


is on Protonix and Carafate.  Eliquis (for a-fib) has been stopped. The patient 


is a DNR/DNI. She does not want to pursue aggressive therapies.





(2) Anemia due to acute blood loss


Status:  Acute


Assessment & Plan:  She received 2 units PRBC on 11/16/18. Her Hgb/Hct did show 


some equilibration, but is stable as compared to yesterday. It does sound as if 


she may have some orthostatic symptoms. Will see how she does today. If 


significantly symptomatic may consider transfusion of an additional 2 units 


PRBC.





(3) Atrial fibrillation


Status:  Chronic


Assessment & Plan:  She is currently maintaining sinus rhythm.  She has been 


managed with amiodarone 200mg M/W/F and Eliquis.  Both meds have been held for 


now. As she is maintaining sinus rhythm, would plan on at least temporarily 


stopping the Eliquis and resuming the amiodarone. She would need to follow up 


with her primary care/cardiology to discuss ongoing management.





(4) Glaucoma


Status:  Chronic


Assessment & Plan:  Continue chronic Lumigan and Lotemax eye drops.





(5) Peripheral neuropathy


Status:  Chronic


Assessment & Plan:  She is chronically on gabapentin. 





(6) Adenocarcinoma of endometrium, stage 3


Status:  Chronic


Assessment & Plan:  Followed by Dr. Perez.  The patient received adjuvant 


chemotherapy with carboplatin and Taxol for six cycles, completed January 8, 2015.  She received also adjuvant radiation therapy with external beam radiation


therapy and intracavitary seed implant.  She is currently in remission.  





(7) Hypokalemia


Status:  Acute


Assessment & Plan:  Will replace with IV fluids.





(8) Hypocalcemia


Status:  Acute


Assessment & Plan:  Will replace with IV calcium gluconate.








Exam


Sepsis Risk:  No Definite Risk











JONATHON CHRISTINE MD            Nov 18, 2018 08:51

## 2018-11-18 NOTE — MEDICAL NUTRITION THERAPY
Nutrition Anthropometrics


Height (Inches):  65.00


Height (Calculated Centimeters:  165.869141


Weight (Pounds):  155


Weight (Calculated Kilograms):  70.307


BMI:  25.8


Poncho Nutrition Score:         Probably Inadequate 


Poncho Nutrition Risk Score:  19


Dietary Referral


Nutrition Risk Factors:      


Nutrition Risk Comment:





Physical Findings


Physical Appearance:  Overweight BMI 25-29


Skin Appearance


Skin Appearance:


Edema


Edema Location Modifier: Right 


Edema Location:              Lower Extremity 


Type of Edema:                 


Degree of Edema:            2+


Gastrointestinal Symptoms


GI Symtoms:                Appetite Changes 


Tube Present:               


Bowel Sounds:              


Recent Bowel Pattern:    


Stool Characteristics:





Nutritional Diagnosis


Nutritional Risk Acuity 3:  OR & > 80 yrs, Nausea, GI Bleed


Past Medical History:  


endometrial cancer, pancreatitis, a-fib, glaucoma, HTN


Nutritional Acuity:  1-High


Nutrition Diagnosis:  Altered GI Function


Nutrition Etiology:  Physiological Causes


Nutrition Problem/Etiology/Sym:  


Altered GI function r/t physiological causes AEB Peptic ulcer disease with


hemorrhage, vomitting blood, adb pain.


Energy Requirement:  1250 (M-SJ*1.1)


Protein Requirement:  80 (1.1g/kg)


Fluid Requirement:  2100 (30ml/kg)


Diet Type:  Diet as Tolerated LUIS/REG


Nutrition Intervention:  Cont diet as ordered, Encourage intake


Additional Diet Restrictions:  OFFER NUTR SUPPLEMENT





Nutrition Monitoring & Eval


Nutrition Goals:  Eat % Meal


RD Patient Assessment Time:  15 minutes


RD Assessment Type:  RD Re-Assessment


Patient Nutrition Acuity:  2-Moderate


Follow Up Date:  Nov 23, 2018


Nutritional Comment:  


11/16 Pt admitted for duodenal obstruction with n/v. On floor pt vomitting


dark blood, 300-400cc in total. Pt NPO. Hx of endometrial cancer,


pancreatitis, a-fib, glaucoma, HTN. Pt hbg 6.4, Hct 19.2, , BUN 46.


If obstruction is cancer, pt does not want surgery and/or chemo, but


comfort care. Pt is high nutrition risk. Will follow. TB





11/18  Pt has dx Peptic ulcer disease with hemorrhage.  Duodenal


obstruction resolved.  Hct cont low at 8.9 and hct at 26.2.  Diet advanced


to regular but no intake reported at this time.  Will offer nutr supplment


to increase kcal and protein intake.  Cont to monitor and encourage


intake.  HONORIO LÓPEZ                    Nov 18, 2018 12:42

## 2018-11-18 NOTE — GENERAL SURGERY PROGRESS NOTE
Subjective


Progress Notes


Subjective


No complaints this morning.  No N/V or abdominal pain.





Physical Exam





Vital Signs








  Date Time  Temp Pulse Resp B/P (MAP) Pulse Ox O2 Delivery O2 Flow Rate FiO2


 


11/18/18 07:34     96 Room Air  


 


11/18/18 07:18 99.0 77  105/53 (70)    


 


11/18/18 03:15       0.5 


 


11/17/18 15:08   18     














Intake and Output 


 


 11/18/18





 07:00


 


Intake Total 3508 ml


 


Output Total 5 ml


 


Balance 3503 ml


 


 


 


Intake Oral 320 ml


 


IV Total 3188 ml


 


Output Emesis 5 ml


 


# Voids 8


 


# Bowel Movements 1


 


# Emeses 3








General Appearance:  Alert, Awake, No Acute Distress, Afebrile


GI:  Soft and Non-Tender


Extremities:  Warm, Perfused


Result Diagram:  


11/18/18 0657                                                                   


            11/18/18 0500





Monitor Interpretation:  Normal Sinus Rhythm





Assessment and Plan


Problems:  


(1) Peptic ulcer disease with hemorrhage


Status:  Acute


Assessment & Plan:  11/15/18:  I have explained the CT findings to the patient 


in detail.  I have told her that there is a lesion in her duodenum that is 


suspicious for a cancer but this would need to be further evaluated 


endoscopically.  She immediately responded that if it is cancer, she does not 


wish to have any surgery or chemotherapy but just wishes to be made comfortable.


 I discussed the possibility of a feeding tube and she indicated that she is not


interested in any sort of feeding tube.  She is interested in and EGD to 


evaluate her duodenum.  Will admit her to Med/Surg, NPO, IV fluids, nausea and 


pain control.  Will ask the Dr. Byrne with the Hospitalist Service to consult


and help manage her comorbidities and assist with end of life planning if 


required.  Will hold off on her Eliquis and will plan on EGD with biopsies the 


day after tomorrow after her Eliquis has worn off.  I have explained this plan 


to her in detail and she is in agreement with this plan.





11/16/18:  Now having GI bleeding, likely from the duodenal lesion but could 


also be from other sources such as PUD.  Will change PPI to PPI gtt.  I did have


a long conversation this morning with the patient and she is agreeable with a 


blood transfusion so will give her 2 Units of pRBC and recheck her H/H, she will


likely require more than 2 Units of pRBC.  Will keep off NSAIDS and blood t


hinners.  Plan on EGD tomorrow morning.  She remains adamant that she does not 


wish to have surgery, even for GI bleeding.  Will follow her closely today, 


continue bowel rest, IV fluids.





11/17/18:  GI bleeding has stopped.  Good response to 2 Units of pRBC yesterday,


Hb down a little this morning to 8.6 but no other signs of active GI bleeding.  


Will follow H/H and will plan on transfusion if Hb less than 8.  Hold off on 


blood thinners/NSAIDS.  Will plan on EGD this morning.  Pt agreeable with 


proceeding with this plan.





11/18/18:  Doing well.  No duodenal mass found on EGD but pt does have c


ircumferential antral ulcers.  Will advance diet and will plan on UGI study and 


MRCP today or tomorrow.  Will change PPI to PO and continue carafate.  H/H 


stable, no further signs of GI bleeding.  Pt is agreeable with this plan.





(2) Duodenal obstruction


Status:  Resolved


(3) Upper GI bleeding


Status:  Resolved


(4) Anemia


Status:  Acute


Condition


Stable.


Time Spent:  < 30 min





Exam


Sepsis Risk:  No Definite Risk





Problem Qualifiers





(1) Anemia:  


Anemia type:  other cause  Other causes of anemia:  acute posthemorrhagic  


Qualified Codes:  D62 - Acute posthemorrhagic anemia








ULLRICH,JOHN A MD              Nov 18, 2018 10:40

## 2018-11-19 VITALS — DIASTOLIC BLOOD PRESSURE: 53 MMHG | SYSTOLIC BLOOD PRESSURE: 92 MMHG

## 2018-11-19 VITALS — DIASTOLIC BLOOD PRESSURE: 66 MMHG | SYSTOLIC BLOOD PRESSURE: 106 MMHG

## 2018-11-19 VITALS — DIASTOLIC BLOOD PRESSURE: 61 MMHG | SYSTOLIC BLOOD PRESSURE: 74 MMHG

## 2018-11-19 VITALS — SYSTOLIC BLOOD PRESSURE: 80 MMHG | DIASTOLIC BLOOD PRESSURE: 68 MMHG

## 2018-11-19 VITALS — DIASTOLIC BLOOD PRESSURE: 74 MMHG | SYSTOLIC BLOOD PRESSURE: 91 MMHG

## 2018-11-19 VITALS — SYSTOLIC BLOOD PRESSURE: 104 MMHG | DIASTOLIC BLOOD PRESSURE: 63 MMHG

## 2018-11-19 VITALS — SYSTOLIC BLOOD PRESSURE: 83 MMHG | DIASTOLIC BLOOD PRESSURE: 69 MMHG

## 2018-11-19 VITALS — DIASTOLIC BLOOD PRESSURE: 77 MMHG | SYSTOLIC BLOOD PRESSURE: 99 MMHG

## 2018-11-19 LAB — PLATELET COUNT, AUTOMATED: 177 K/UL (ref 150–450)

## 2018-11-19 RX ADMIN — LOTEPREDNOL ETABONATE SCH ML: 5 SUSPENSION/ DROPS OPHTHALMIC at 20:46

## 2018-11-19 RX ADMIN — GABAPENTIN SCH MG: 300 CAPSULE ORAL at 20:48

## 2018-11-19 RX ADMIN — GABAPENTIN SCH MG: 300 CAPSULE ORAL at 09:52

## 2018-11-19 RX ADMIN — LOTEPREDNOL ETABONATE SCH ML: 5 SUSPENSION/ DROPS OPHTHALMIC at 09:52

## 2018-11-19 RX ADMIN — SUCRALFATE SCH GM: 1 TABLET ORAL at 20:48

## 2018-11-19 RX ADMIN — SUCRALFATE SCH GM: 1 TABLET ORAL at 06:33

## 2018-11-19 RX ADMIN — BIMATOPROST SCH ML: 0.1 SOLUTION/ DROPS OPHTHALMIC at 20:48

## 2018-11-19 RX ADMIN — LOTEPREDNOL ETABONATE SCH ML: 5 SUSPENSION/ DROPS OPHTHALMIC at 09:53

## 2018-11-19 RX ADMIN — SUCRALFATE SCH GM: 1 TABLET ORAL at 11:52

## 2018-11-19 RX ADMIN — SUCRALFATE SCH GM: 1 TABLET ORAL at 16:36

## 2018-11-19 RX ADMIN — PANTOPRAZOLE SODIUM SCH MG: 40 TABLET, DELAYED RELEASE ORAL at 09:52

## 2018-11-19 NOTE — GENERAL SURGERY PROGRESS NOTE
Subjective


Progress Notes


Subjective


No complaints.  No pain.  No N/V or abdominal pain. Passing stool, dark.





Physical Exam





Vital Signs








  Date Time  Temp Pulse Resp B/P (MAP) Pulse Ox O2 Delivery O2 Flow Rate FiO2


 


11/19/18 07:03  99      


 


11/19/18 05:56     86   


 


11/19/18 02:32   16 106/66 (79)  Room Air  


 


11/18/18 23:29 98.4       


 


11/18/18 22:00       1.0 














Intake and Output 


 


 11/19/18





 06:59


 


Intake Total 891 ml


 


Balance 891 ml


 


 


 


Intake Oral 650 ml


 


IV Total 241 ml


 


# Voids 6


 


# Bowel Movements 1








General Appearance:  Alert, Awake, No Acute Distress, Afebrile


GI:  Soft and Non-Tender


Extremities:  Warm, Perfused


Result Diagram:  


11/19/18 0522 11/19/18 0522





Monitor Interpretation:  Normal Sinus Rhythm





Assessment and Plan


Problems:  


(1) Peptic ulcer disease with hemorrhage


Status:  Acute


Assessment & Plan:  11/15/18:  I have explained the CT findings to the patient 


in detail.  I have told her that there is a lesion in her duodenum that is 


suspicious for a cancer but this would need to be further evaluated 


endoscopically.  She immediately responded that if it is cancer, she does not 


wish to have any surgery or chemotherapy but just wishes to be made comfortable.


 I discussed the possibility of a feeding tube and she indicated that she is not


interested in any sort of feeding tube.  She is interested in and EGD to 


evaluate her duodenum.  Will admit her to Med/Surg, NPO, IV fluids, nausea and 


pain control.  Will ask the Dr. Byrne with the Hospitalist Service to consult


and help manage her comorbidities and assist with end of life planning if 


required.  Will hold off on her Eliquis and will plan on EGD with biopsies the 


day after tomorrow after her Eliquis has worn off.  I have explained this plan 


to her in detail and she is in agreement with this plan.





11/16/18:  Now having GI bleeding, likely from the duodenal lesion but could 


also be from other sources such as PUD.  Will change PPI to PPI gtt.  I did have


a long conversation this morning with the patient and she is agreeable with a 


blood transfusion so will give her 2 Units of pRBC and recheck her H/H, she will


likely require more than 2 Units of pRBC.  Will keep off NSAIDS and blood 


thinners.  Plan on EGD tomorrow morning.  She remains adamant that she does not 


wish to have surgery, even for GI bleeding.  Will follow her closely today, 


continue bowel rest, IV fluids.





11/17/18:  GI bleeding has stopped.  Good response to 2 Units of pRBC yesterday,


Hb down a little this morning to 8.6 but no other signs of active GI bleeding.  


Will follow H/H and will plan on transfusion if Hb less than 8.  Hold off on 


blood thinners/NSAIDS.  Will plan on EGD this morning.  Pt agreeable with 


proceeding with this plan.





11/18/18:  Doing well.  No duodenal mass found on EGD but pt does have 


circumferential antral ulcers.  Will advance diet and will plan on UGI study and


MRCP today or tomorrow.  Will change PPI to PO and continue carafate.  H/H 


stable, no further signs of GI bleeding.  Pt is agreeable with this plan.





11/19/18:  Doing well.  Will get UGI and MRCP today.  If these are without 


problems then will d/c her to home tomorrow on PPI and carafate.





(2) Duodenal obstruction


Status:  Resolved


(3) Upper GI bleeding


Status:  Resolved


(4) Anemia


Status:  Acute


Condition


STable.


Time Spent:  < 30 min





Exam


Sepsis Risk:  No Definite Risk





Problem Qualifiers





(1) Anemia:  


Anemia type:  other cause  Other causes of anemia:  acute posthemorrhagic  


Qualified Codes:  D62 - Acute posthemorrhagic anemia








ULLRICH,JOHN A MD              Nov 19, 2018 07:57

## 2018-11-19 NOTE — HOSPITALIST PROGRESS NOTE
Subjective


Progress Notes


Subjective


86F admitted for SBO, LESLY overnight. CT and small bowel with follow through 


today. Had antral ulcers with no visualized duodenal mass on endoscopy.





Patient Complains of:


Gastrointestinal:  No Nausea, No Vomiting





Physical Exam





Vital Signs








  Date Time  Temp Pulse Resp B/P (MAP) Pulse Ox O2 Delivery O2 Flow Rate FiO2


 


11/19/18 14:13  106  91/74 (80)    


 


11/19/18 11:49 98.1  16  94 Nasal Cannula 1.0 














Intake and Output 


 


 11/19/18





 06:59


 


Intake Total 891 ml


 


Balance 891 ml


 


 


 


Intake Oral 650 ml


 


IV Total 241 ml


 


# Voids 6


 


# Bowel Movements 1








General Appearance:  Alert, Awake, No Acute Distress


Neuro:  No Gross deficits


Eyes:  PERRLA


ENT:  Normal


Neck:  No Masses


Cardiovascular:  Other (irregularly irregular)


Respiratory:  No Respiratory Distress


GI:  Soft and Non-Tender


Musculoskeletal:  No Weakness/Pain


Extremities:  Soft and Non Tender, Warm, Pulses, Perfused


Integumentary:  Skin Intact without Lesion / Mass


Psych:  Alert & Oriented X3


Result Diagram:  


11/19/18 0522 11/19/18 0522





Monitor Interpretation:  Normal Sinus Rhythm





Assessment and Plan


Problems:  


(1) Duodenal obstruction


Status:  Resolved


Assessment & Plan:  She presented with acute onset of N/V with hematemesis.  By 


CT there appeared to be partial obstruction of the second portion of the 


duodenum secondary to a near-circumferential mass that was worrisome for mary


ocarcinoma.  Dr. Ullrich performed EGD yesterday, which showed ulcers. She is on


Protonix and Carafate.  Eliquis (for a-fib) has been stopped. The patient is a 


DNR/DNI. She does not want to pursue aggressive therapies. MRI shows fold in 


duodenum but no mass. Anticipate discharge tomorrow. 





(2) Anemia due to acute blood loss


Status:  Acute


Assessment & Plan:  She received 2 units PRBC on 11/16/18. Her Hgb/Hct did show 


some equilibration, stable.





(3) Atrial fibrillation


Status:  Chronic


Assessment & Plan:  She is currently maintaining sinus rhythm.  She has been 


managed with amiodarone 200mg M/W/F and Eliquis. Resumed amiodarone, will need 


to follow up with PCP and cardiology to evaluate resuming Eliquis.





(4) Glaucoma


Status:  Chronic


Assessment & Plan:  Continue chronic Lumigan and Lotemax eye drops.





(5) Peripheral neuropathy


Status:  Chronic


Assessment & Plan:  She is chronically on gabapentin. 





(6) Adenocarcinoma of endometrium, stage 3


Status:  Chronic


Assessment & Plan:  Followed by Dr. Perez.  The patient received adjuvant 


chemotherapy with carboplatin and Taxol for six cycles, completed January 8, 2015.  She received also adjuvant radiation therapy with external beam radiation


therapy and intracavitary seed implant.  She is currently in remission.  





(7) Hypokalemia


Status:  Acute


Assessment & Plan:  Improved.





(8) Hypocalcemia


Status:  Acute


Assessment & Plan:  Improved. PRN replacement. 








Exam


Sepsis Risk:  No Definite Risk











MCKEON LACEY KEATING DO       Nov 19, 2018 14:32

## 2018-11-19 NOTE — RADIOLOGY IMAGING REPORT
[place] [facility] [place] 

 

PATIENT NAME: Celsa العلي

: 1932

MR: [id number]

V: [id number]

EXAM DATE: 

ORDERING PHYSICIAN: JOHN ULLRICH

TECHNOLOGIST: 

 

Location: [facility] - [place]

Patient: Celsa العلي

: 1932

MRN: [id number]

Visit/Account:[id number]

Date of Sevice: 2018

 

ACCESSION #: [id number]

 

MRI CHOLANGIOPANCREAT W/WO CON

 

HISTORY:  Duodenal mass on CT, not seen on endoscopy

 

TECHNIQUE:  Multiplanar multisequence magnetic resonance imaging of the abdomen without and with intr
avenous contrast including magnetic resonance cholangiopancreatography (MRCP).

 

CONTRAST:  15 mL MultiHance IV.

 

COMPARISON:  CT abdomen and pelvis 11/15/2018

 

FINDINGS:

Visualized lung bases: Grossly unremarkable.

 

Liver: Negative.

 

Gallbladder: Normal.  No evidence of gallstone.

 

Bile ducts: Intrahepatic bile ducts are normal in caliber.  Common bile duct is normal in size, measu
ring 7 mm.  There is no evidence of filling defect in the common bile duct.

 

Spleen: Negative.

 

Adrenal glands: Negative.

 

Pancreas: The pancreas is atrophied.  There is no suspicious pancreatic mass.  Pancreatic duct is nor
mal in size.

 

Kidneys: Negative.

 

Vessels/spaces/nodes: No bulky adenopathy or ascities.

 

Visualized GI: The stomach and the sweep of the duodenum appear normal.  At the previously seen possi
ble duodenal mass, there is the appearance of a slight mucosal irregularity, however it may represent
 a normal fold in the second portion the duodenum.  There is no discrete abnormal mass or enhancement
.

 

Bones/soft tissues: Unremarkable.

 

IMPRESSION:

1.  In the superior the duodenum, there is a slight mucosal irregularity in the second portion, that 
may represent a normal fold in the duodenum.  There is no discrete evidence of suspicious mass.  Ther
e is no duodenal adenopathy or obstruction.

2.  Normal appearance of the liver, gallbladder, pancreas, and pancreatic duct.

 

According to the indication, an upper endoscopy was negative.  I believe that the possible mass seen 
on the prior CT in the second portion the duodenum is likely a normal fold in the duodenum, and with 
a negative upper endoscopy, I feel no further imaging is needed.  At most, a follow-up CT of the abdo
men in one year could be performed to confirm no change in the area of the duodenum.

 

Report Dictated By: Piotr Magana at 2018 9:50 AM

 

Report E-Signed By: Piotr Magana  at 2018 10:06 AM

 

WSN:[email or web address]

## 2018-11-19 NOTE — RADIOLOGY IMAGING REPORT
FACILITY: [facility] 

 

PATIENT NAME: Celsa العلي

: 1932

MR: [id number]

V: [id number]

EXAM DATE: 

ORDERING PHYSICIAN: JOHN ULLRICH

TECHNOLOGIST: 

 

Location: [facility] - [place]

Patient: Celsa العلي

: 1932

MRN: [id number]

Visit/Account:[id number]

Date of Sevice: 2018

 

ACCESSION #: [id number]

 

UPPER GI SERIES W/O AIR

 

Indication: Hematochezia.

 

Comparison: None.

 

Radiation dose: DAP 1668 uGym2; AK 48.2 mGy

 

Findings: Upper GI was performed with both thin and thick barium after the ingestion of effervescent 
crystals.  The mucosa of the esophagus is smooth without evidence of ulcer, mass, or erosion.  Multip
le tertiary peristaltic waves were seen.  Hypokinesia is identified..  There was no gastroesophageal 
reflux.

 

Images of the stomach are normal, with normal fold pattern identified.  There is a large diverticulum
 on the third portion of the duodenum.  Normal peristalsis is seen.

 

Impression:

1.  No evidence of ulcer mass or stricture.

2.  Hypokinesia of the esophagus is seen, with multiple tertiary waves identified.

3.  Benign-appearing diverticula third portion of the duodenum.

 

Report Dictated By: Piotr Magana at 2018 10:37 AM

 

Report E-Signed By: Piotr Magana  at 2018 10:39 AM

 

WSN:[id number]

## 2018-11-20 VITALS — SYSTOLIC BLOOD PRESSURE: 154 MMHG | DIASTOLIC BLOOD PRESSURE: 98 MMHG

## 2018-11-20 VITALS — SYSTOLIC BLOOD PRESSURE: 109 MMHG | DIASTOLIC BLOOD PRESSURE: 60 MMHG

## 2018-11-20 VITALS — DIASTOLIC BLOOD PRESSURE: 61 MMHG | SYSTOLIC BLOOD PRESSURE: 116 MMHG

## 2018-11-20 VITALS — SYSTOLIC BLOOD PRESSURE: 152 MMHG | DIASTOLIC BLOOD PRESSURE: 82 MMHG

## 2018-11-20 VITALS — SYSTOLIC BLOOD PRESSURE: 143 MMHG | DIASTOLIC BLOOD PRESSURE: 76 MMHG

## 2018-11-20 RX ADMIN — GABAPENTIN SCH MG: 300 CAPSULE ORAL at 09:20

## 2018-11-20 RX ADMIN — PANTOPRAZOLE SODIUM SCH MG: 40 TABLET, DELAYED RELEASE ORAL at 09:20

## 2018-11-20 RX ADMIN — SUCRALFATE SCH GM: 1 TABLET ORAL at 11:36

## 2018-11-20 RX ADMIN — SUCRALFATE SCH GM: 1 TABLET ORAL at 06:18

## 2018-11-20 RX ADMIN — LOTEPREDNOL ETABONATE SCH ML: 5 SUSPENSION/ DROPS OPHTHALMIC at 09:20

## 2018-11-20 RX ADMIN — LOTEPREDNOL ETABONATE SCH ML: 5 SUSPENSION/ DROPS OPHTHALMIC at 09:00

## 2018-11-20 NOTE — HOSPITALIST PROGRESS NOTE
Subjective


Progress Notes


Subjective


She has no complaints this morning. She had no acute events overnight. She would


like to go home today.





Patient Complains of:


Cardiovascular:  No: Chest Pain


Respiratory:  No: Shortness of Breath





Physical Exam





Vital Signs








  Date Time  Temp Pulse Resp B/P (MAP) Pulse Ox O2 Delivery O2 Flow Rate FiO2


 


11/20/18 11:37  83 16 143/76 (98) 94 Room Air  


 


11/20/18 10:42        


 


11/20/18 06:35 98.4       














Intake and Output 


 


 11/20/18





 07:00


 


Intake Total 1160 ml


 


Balance 1160 ml


 


 


 


Intake Oral 660 ml


 


IV Total 500 ml


 


# Voids 3








General Appearance:  Alert, Awake, No Acute Distress, Afebrile


Neuro:  No Gross deficits


Cardiovascular:  Regular Rate and Rhythm


Respiratory:  No Respiratory Distress, Clear to Auscultation


GI:  Soft and Non-Tender


Extremities:  No Edema


Psych:  Alert & Oriented X3, Appropriate Mood & Affect


Result Diagram:  


11/19/18 0522 11/19/18 0522





Monitor Interpretation:  Normal Sinus Rhythm





Assessment and Plan


Problems:  


(1) Duodenal obstruction


Status:  Resolved


Assessment & Plan:  She presented with acute onset of N/V with hematemesis.  By 


CT there appeared to be partial obstruction of the second portion of the 


duodenum secondary to a near-circumferential mass that was worrisome for 


adenocarcinoma.  Dr. Ullrich performed EGD yesterday, which showed ulcers. She 


is on Protonix and Carafate.  Eliquis (for a-fib) has been stopped. The patient 


is a DNR/DNI. She does not want to pursue aggressive therapies. MRI shows fold 


in duodenum but no mass. She will be discharged today.





(2) Anemia due to acute blood loss


Status:  Acute


Assessment & Plan:  She received 2 units PRBC on 11/16/18. Her Hgb/Hct did show 


some equilibration, stable.





(3) Atrial fibrillation


Status:  Chronic


Assessment & Plan:  She is currently maintaining sinus rhythm.  She has been 


managed with amiodarone 200mg M/W/F and Eliquis. Resumed amiodarone, will need 


to follow up with PCP and cardiology to evaluate resuming Eliquis.





(4) Glaucoma


Status:  Chronic


Assessment & Plan:  Continue chronic Lumigan and Lotemax eye drops.





(5) Peripheral neuropathy


Status:  Chronic


Assessment & Plan:  She is chronically on gabapentin. 





(6) Adenocarcinoma of endometrium, stage 3


Status:  Chronic


Assessment & Plan:  Followed by Dr. Perez.  The patient received adjuvant 


chemotherapy with carboplatin and Taxol for six cycles, completed January 8, 2015.  She received also adjuvant radiation therapy with external beam radiation


therapy and intracavitary seed implant.  She is currently in remission.  





(7) Hypokalemia


Status:  Acute


Assessment & Plan:  Improved.





(8) Hypocalcemia


Status:  Acute


Assessment & Plan:  Improved. 








Exam


Sepsis Risk:  No Definite Risk











ERMIAS YOUSSEF FNP            Nov 20, 2018 13:46

## 2018-11-20 NOTE — SHORT(OUTPT) DISCHARGE SUMMARY
Discharge Summary


Reason for Hosp/Final Diag:  


(1) Peptic ulcer disease with hemorrhage


Status:  Acute


Hospital Course & Plan:  11/15/18:  I have explained the CT findings to the 


patient in detail.  I have told her that there is a lesion in her duodenum that 


is suspicious for a cancer but this would need to be further evaluated 


endoscopically.  She immediately responded that if it is cancer, she does not 


wish to have any surgery or chemotherapy but just wishes to be made comfortable.


 I discussed the possibility of a feeding tube and she indicated that she is not


interested in any sort of feeding tube.  She is interested in and EGD to 


evaluate her duodenum.  Will admit her to Med/Surg, NPO, IV fluids, nausea and 


pain control.  Will ask the Dr. Byrne with the Hospitalist Service to consult


and help manage her comorbidities and assist with end of life planning if 


required.  Will hold off on her Eliquis and will plan on EGD with biopsies the 


day after tomorrow after her Eliquis has worn off.  I have explained this plan 


to her in detail and she is in agreement with this plan.





11/16/18:  Now having GI bleeding, likely from the duodenal lesion but could 


also be from other sources such as PUD.  Will change PPI to PPI gtt.  I did have


a long conversation this morning with the patient and she is agreeable with a 


blood transfusion so will give her 2 Units of pRBC and recheck her H/H, she will


likely require more than 2 Units of pRBC.  Will keep off NSAIDS and blood 


thinners.  Plan on EGD tomorrow morning.  She remains adamant that she does not 


wish to have surgery, even for GI bleeding.  Will follow her closely today, 


continue bowel rest, IV fluids.





11/17/18:  GI bleeding has stopped.  Good response to 2 Units of pRBC yesterday,


Hb down a little this morning to 8.6 but no other signs of active GI bleeding.  


Will follow H/H and will plan on transfusion if Hb less than 8.  Hold off on 


blood thinners/NSAIDS.  Will plan on EGD this morning.  Pt agreeable with 


proceeding with this plan.





11/18/18:  Doing well.  No duodenal mass found on EGD but pt does have 


circumferential antral ulcers.  Will advance diet and will plan on UGI study and


MRCP today or tomorrow.  Will change PPI to PO and continue carafate.  H/H 


stable, no further signs of GI bleeding.  Pt is agreeable with this plan.





11/19/18:  Doing well.  Will get UGI and MRCP today.  If these are without 


problems then will d/c her to home tomorrow on PPI and carafate.





11/20/18:  Doing well.  No complaints.  No pain.  No N/V, hematemesis or blood 


in stools.  UGI study and MRCP yesterday were unremarkable, no signs of cancer. 


Will d/c to home this morning.





(2) Duodenal obstruction


Status:  Resolved


(3) Upper GI bleeding


Status:  Resolved


(4) Anemia


Status:  Acute


Departure


Discharge to:  Home, Self Care





Discharge Instructions


Home Meds


Active Scripts


Sucralfate (SUCRALFATE) 1 Gm Tablet, 1 GM PO ACHS1, #120 TAB 0 Refills


   Take 4 times every day until gone then stop, no need for refills


   Prov:ULLRICH,JOHN A MD         11/19/18


Pantoprazole Sodium (PANTOPRAZOLE SODIUM) 40 Mg Tablet.dr, 1 TAB PO QDAY, #60 


TAB 6 Refills


   Prov:ULLRICH,JOHN A MD         11/19/18


Reported Medications


Acetaminophen/Diphenhydramine (ACETAMINOPHEN PM CAPLET) 1 Each Tablet, 1 EACH PO


QHS for Sleep, TAB


   11/16/18


Amiodarone Hcl (AMIODARONE HCL) 200 Mg Tablet, 200 MG PO AS DIRECTED for 30 


Days, #60


   11/16/18


Gabapentin (GABAPENTIN) 300 Mg Capsule, 600 MG PO BID, CAPSULE


   4/12/18


Calcium Carbonate (CALCIUM) 500 Mg Tab.chew, 500 MG PO BID, TAB.CHEW


   11/24/16


Discontinued Reported Medications


Apixaban (ELIQUIS) 5 Mg Tablet, 5 MG PO BID


   2/2/18


Amiodarone HCl/D5w (Amiodarone 150 mg/100 ml-D5w) 150 Mg/100 Ml (1.5 Mg/Ml) 


Plast..bag, 200 MG PO BID


   4/12/18


Pyridoxine Hcl (VITAMIN B-6) 25 Mg Tablet, 25 MG PO DAILY


   11/30/17


Cyanocobalamin (Vitamin B-12) (VITAMIN B-12) 1,000 Mcg Tablet, 1000 MCG PO DAILY


   11/30/17


Discontinued Scripts


Ondansetron Hcl (ZOFRAN) 4 Mg Tablet, 4 MG PO Q8H for Nausea, #10 TAB


   Prov:KRISTEN KAHN MD         11/15/18


Diet:  Regular


Activity:  As Tolerated


Special Instructions:  


Please call my office at [phone] (main number) or [phone] (nurse's


line) if you have problems after going home or if you want to schedule an


appointment to have your port removed.  You may restart the Eliquis on


Monday, November 26, 2018, if you've had no further signs of bleeding.


Avoid NSAIDS (motrin, ibuprofen, aleve, advil, naproxen, naprosyn, etc.).


Tylenol is OK to use for headaches and other pains.  Take pantoprazole


every morning on an empty stomach and wait 30 minutes before eating.  Take


carafate before eating and at bedtime for 30 days and then when you run


out of these tablets you can stop taking it, you don't need refills of


this medication.  You should stay on pantoprazole indefinitely and I've


provided you with refills of this medication.





Problem Qualifiers





(1) Anemia:  


Anemia type:  other cause  Other causes of anemia:  acute posthemorrhagic  


Qualified Codes:  D62 - Acute posthemorrhagic anemia








ULLRICH,JOHN A MD              Nov 19, 2018 19:27

## 2019-01-03 ENCOUNTER — HOSPITAL ENCOUNTER (OUTPATIENT)
Dept: HOSPITAL 89 - SPU | Age: 84
LOS: 89 days | End: 2019-04-02
Attending: INTERNAL MEDICINE
Payer: MEDICARE

## 2019-01-03 ENCOUNTER — HOSPITAL ENCOUNTER (OUTPATIENT)
Dept: HOSPITAL 89 - SPU | Age: 84
End: 2019-01-03
Attending: NURSE PRACTITIONER
Payer: MEDICARE

## 2019-01-03 VITALS — BODY MASS INDEX: 24.96 KG/M2

## 2019-01-03 VITALS — BODY MASS INDEX: 24.96 KG/M2 | WEIGHT: 138.23 LBS

## 2019-01-03 VITALS
SYSTOLIC BLOOD PRESSURE: 160 MMHG | SYSTOLIC BLOOD PRESSURE: 160 MMHG | DIASTOLIC BLOOD PRESSURE: 84 MMHG | DIASTOLIC BLOOD PRESSURE: 84 MMHG

## 2019-01-03 DIAGNOSIS — Z85.42: Primary | ICD-10-CM

## 2019-01-03 DIAGNOSIS — Z79.899: ICD-10-CM

## 2019-01-03 DIAGNOSIS — M81.0: Primary | ICD-10-CM

## 2019-01-03 DIAGNOSIS — M81.0: ICD-10-CM

## 2019-01-03 DIAGNOSIS — G62.9: ICD-10-CM

## 2019-01-03 LAB — PLATELET COUNT, AUTOMATED: 366 K/UL (ref 150–450)

## 2019-01-03 PROCEDURE — 84520 ASSAY OF UREA NITROGEN: CPT

## 2019-01-03 PROCEDURE — 84155 ASSAY OF PROTEIN SERUM: CPT

## 2019-01-03 PROCEDURE — 84132 ASSAY OF SERUM POTASSIUM: CPT

## 2019-01-03 PROCEDURE — 86304 IMMUNOASSAY TUMOR CA 125: CPT

## 2019-01-03 PROCEDURE — 84460 ALANINE AMINO (ALT) (SGPT): CPT

## 2019-01-03 PROCEDURE — 36415 COLL VENOUS BLD VENIPUNCTURE: CPT

## 2019-01-03 PROCEDURE — 82565 ASSAY OF CREATININE: CPT

## 2019-01-03 PROCEDURE — 96372 THER/PROPH/DIAG INJ SC/IM: CPT

## 2019-01-03 PROCEDURE — 85025 COMPLETE CBC W/AUTO DIFF WBC: CPT

## 2019-01-03 PROCEDURE — 84075 ASSAY ALKALINE PHOSPHATASE: CPT

## 2019-01-03 PROCEDURE — 82947 ASSAY GLUCOSE BLOOD QUANT: CPT

## 2019-01-03 PROCEDURE — 84450 TRANSFERASE (AST) (SGOT): CPT

## 2019-01-03 PROCEDURE — 82247 BILIRUBIN TOTAL: CPT

## 2019-01-03 PROCEDURE — 84295 ASSAY OF SERUM SODIUM: CPT

## 2019-01-03 PROCEDURE — 82374 ASSAY BLOOD CARBON DIOXIDE: CPT

## 2019-01-03 PROCEDURE — 82435 ASSAY OF BLOOD CHLORIDE: CPT

## 2019-01-03 PROCEDURE — 82310 ASSAY OF CALCIUM: CPT

## 2019-01-03 PROCEDURE — 82040 ASSAY OF SERUM ALBUMIN: CPT

## 2019-01-07 NOTE — ONCOLOGY FOLLOW UP NOTE
EVENT DATE: January 3, 2019 



CHIEF COMPLAINT 

Followup for endometrial adenocarcinoma.  



HISTORY OF PRESENT ILLNESS 

Patient is an 86-year-old female who was seen today in followup.  She was 

diagnosed with a stage IIIC-1 endometrial adenocarcinoma in August 2014.  She 

underwent ISMAEL/BSO with lymphadenectomy and pelvic washing in August 2014.  She 

received four cycles of adjuvant chemotherapy with carboplatin and Taxol, 

completed January 2015.  



DICTATION ENDS HERE

MTDD

## 2019-01-07 NOTE — ONCOLOGY FOLLOW UP NOTE
EVENT DATE: January 3, 2019 



CHIEF COMPLAINT

Followup for endometrial carcinoma.  



HISTORY OF PRESENT ILLNESS 

Patient is an 86-year-old female who was seen today in followup.  Overall, she 

is feeling well.  She developed hematemesis in November 2018.  She underwent 

endoscopy, which showed ulcers.  She was treated with Protonix and Carafate and 

continues on Protonix.  She feels back to baseline at this time.  She does have 

chronic neuropathy in both feet as well as her right hand.  She will also 

receive Prolia today for osteoporosis.  She denies any other new complaints.  



ONCOLOGY HISTORY 

Patient was diagnosed with a stage IIIC-1 endometrial adenocarcinoma, FIGO grade

2-3, in August 2014.  At that time she had developed abnormal vaginal bleeding. 

She underwent ISMAEL/BSO on 08/20/14, and final pathology was positive for 

endometrioid adenocarcinoma.  Tumor was 5 cm with 100% invasion into the 

myometrium with focal serosal involvement.  Lymphovascular invasion positive, 

but no cervical stromal or parametrial involvement (stage IIIC-1, pT3A pPN1).  

Completed adjuvant chemotherapy for four cycles with carboplatin and Taxol in 

January 2015, followed by radiation therapy with external beam radiation and 

intracavitary radiation therapy.  Disease has been quiescent since that time.  



PAST MEDICAL HISTORY 

1.  Endometrial carcinoma, August 2014.  

2.  Melanoma of the cheek, 2006.  

3.  Pancreatitis, 2013.  

4.  Atrial fibrillation.  

5.  Ulcer, November 2018.  



PAST SURGICAL HISTORY 

1.  ISMAEL/BSO, 08/20/14.  

2.  Mastectomy.  

3.  Tonsillectomy.  



FAMILY HISTORY 

Cancer in the brother and mother.  



SOCIAL HISTORY 

Patient is .  She has two grown daughters.  She is a retired nurse from 

[employer] in the [place].  She has never smoked.  She does not 

drink alcohol.  



MEDICATIONS 

1.  Pantoprazole 40 mg daily.  

2.  Amiodarone 200 mg.  

3.  Gabapentin 600 mg b.i.d. 

4.  Calcium carbonate 500 mg b.i.d. 



REVIEW OF SYSTEMS 

A 12-point review of systems is performed and is negative except as stated 

above.  



PHYSICAL EXAMINATION 

VITAL SIGNS:  Weight 62.7 kg, blood pressure 160/84, pulse 84, respirations 16, 

temperature 97.9, O2 saturation 98%.  

GENERAL:  Patient is a well-developed, well-nourished female in no acute 

distress.  

HEAD:  Normocephalic, atraumatic.  

EYES:  Sclerae anicteric.  

MOUTH:  Moist mucous membranes.

LUNGS:  Clear bilaterally.  

CARDIOVASCULAR:  Heart rate regular, 84 per minute, without murmur, S3 or S4.  

ABDOMEN:  Soft, with mild midepigastric tenderness.  Active bowel sounds.  

EXTREMITIES:  No edema.  Arthritic changes in both hands.  

NEUROLOGIC:  Nonfocal.  



LABORATORY 

CBC today reveals a WBC of 5.5, hemoglobin 12.5, hematocrit 37.8, platelets 

366,000.  CMP is within normal limits.   is 8.  



IMPRESSION AND PLAN

The patient is an 86-year-old female diagnosed with stage IIIC-1 endometrial 

adenocarcinoma in August 2014.  Underwent total abdominal hysterectomy/bilateral

salpingo-oophorectomy followed by adjuvant chemotherapy with carboplatin and 

Taxol, completed January 2015.  She then completed adjuvant radiation therapy.  

Disease has been quiescent since that time.  



1.  Endometrial adenocarcinoma.  No signs or symptoms of disease recurrence.  

 has remained stable since completion of chemotherapy.  

2.  Ulcer.  Recently developed hematemesis.  Endoscopy revealed ulcer.  She 

continues on Protonix and feels she is doing well with this.  She does have some

mild midepigastric tenderness, but "nothing like before."  No evidence of 

anemia.

3.  Osteoporosis.  Continue Prolia 60 mg today and every six months.  

4.  Neuropathy.  Chronic peripheral neuropathy in both feet and right hand.  

This is somewhat controlled with gabapentin, which she will continue.  

5.  Follow up with Dr. Preez in three months for continued care.  CBC, 

CMP, and  will be drawn at that time.  

MTDD

## 2019-03-22 ENCOUNTER — HOSPITAL ENCOUNTER (OUTPATIENT)
Dept: HOSPITAL 89 - LAB | Age: 84
End: 2019-03-22
Attending: INTERNAL MEDICINE
Payer: MEDICARE

## 2019-03-22 VITALS — BODY MASS INDEX: 24.96 KG/M2

## 2019-03-22 DIAGNOSIS — I48.0: Primary | ICD-10-CM

## 2019-03-22 PROCEDURE — 36415 COLL VENOUS BLD VENIPUNCTURE: CPT

## 2019-03-22 PROCEDURE — 84443 ASSAY THYROID STIM HORMONE: CPT

## 2019-04-24 ENCOUNTER — HOSPITAL ENCOUNTER (OUTPATIENT)
Dept: HOSPITAL 89 - SPU | Age: 84
LOS: 1 days | Discharge: HOME | End: 2019-04-25
Attending: INTERNAL MEDICINE
Payer: MEDICARE

## 2019-04-24 VITALS — BODY MASS INDEX: 24.96 KG/M2

## 2019-04-24 DIAGNOSIS — C54.1: Primary | ICD-10-CM

## 2019-06-11 ENCOUNTER — HOSPITAL ENCOUNTER (OUTPATIENT)
Dept: HOSPITAL 89 - ONC | Age: 84
LOS: 36 days | Discharge: HOME | End: 2019-07-17
Attending: INTERNAL MEDICINE
Payer: MEDICARE

## 2019-06-11 VITALS — DIASTOLIC BLOOD PRESSURE: 90 MMHG | SYSTOLIC BLOOD PRESSURE: 136 MMHG

## 2019-06-11 VITALS — BODY MASS INDEX: 24.96 KG/M2

## 2019-06-11 DIAGNOSIS — G47.00: ICD-10-CM

## 2019-06-11 DIAGNOSIS — Z79.899: ICD-10-CM

## 2019-06-11 DIAGNOSIS — Z92.21: ICD-10-CM

## 2019-06-11 DIAGNOSIS — Z92.3: ICD-10-CM

## 2019-06-11 DIAGNOSIS — G62.9: ICD-10-CM

## 2019-06-11 DIAGNOSIS — Z85.42: Primary | ICD-10-CM

## 2019-06-11 LAB — PLATELET COUNT, AUTOMATED: 267 K/UL (ref 150–450)

## 2019-06-11 PROCEDURE — 82374 ASSAY BLOOD CARBON DIOXIDE: CPT

## 2019-06-11 PROCEDURE — 84520 ASSAY OF UREA NITROGEN: CPT

## 2019-06-11 PROCEDURE — 82947 ASSAY GLUCOSE BLOOD QUANT: CPT

## 2019-06-11 PROCEDURE — 84450 TRANSFERASE (AST) (SGOT): CPT

## 2019-06-11 PROCEDURE — 84132 ASSAY OF SERUM POTASSIUM: CPT

## 2019-06-11 PROCEDURE — 36415 COLL VENOUS BLD VENIPUNCTURE: CPT

## 2019-06-11 PROCEDURE — 84155 ASSAY OF PROTEIN SERUM: CPT

## 2019-06-11 PROCEDURE — 84460 ALANINE AMINO (ALT) (SGPT): CPT

## 2019-06-11 PROCEDURE — 86304 IMMUNOASSAY TUMOR CA 125: CPT

## 2019-06-11 PROCEDURE — 85025 COMPLETE CBC W/AUTO DIFF WBC: CPT

## 2019-06-11 PROCEDURE — 82247 BILIRUBIN TOTAL: CPT

## 2019-06-11 PROCEDURE — 82565 ASSAY OF CREATININE: CPT

## 2019-06-11 PROCEDURE — 82435 ASSAY OF BLOOD CHLORIDE: CPT

## 2019-06-11 PROCEDURE — 82310 ASSAY OF CALCIUM: CPT

## 2019-06-11 PROCEDURE — 82040 ASSAY OF SERUM ALBUMIN: CPT

## 2019-06-11 PROCEDURE — 84295 ASSAY OF SERUM SODIUM: CPT

## 2019-06-11 PROCEDURE — 84075 ASSAY ALKALINE PHOSPHATASE: CPT

## 2019-06-12 NOTE — ONCOLOGY FOLLOW UP NOTE
EVENT DATE: June 11, 2019 



CHIEF COMPLAINT 

Followup for endometrial carcinoma.  



HISTORY OF PRESENT ILLNESS 

Patient is an 86-year-old female who is seen today in five-month followup.  She 

continues to do well.  However, due to her chronic neuropathy in both feet as 

well as her right hand, she has noted loss of balance.  She uses her walker 

exclusively at home and presents in a wheelchair today.  She had a GI bleed in 

November 2018, and upper endoscopy showed ulceration and she was treated with 

Protonix and Carafate.  She continues on Protonix and has had no bleeding since 

then.  She had been receiving Prolia on an every-six-month basis, but feels that

it is not necessary anymore.  Otherwise, she denies any new complaints.  She 

asked for a refill on Xanax.  She uses this very sparingly for sleep.  



ONCOLOGY HISTORY 

Patient was diagnosed with a stage IIIC-1 endometrial adenocarcinoma, FIGO grade

2-3, in August 2014.  At that time she had developed abnormal vaginal bleeding. 

She underwent ISMAEL/BSO on 08/20/14, and final pathology was positive for 

endometrioid adenocarcinoma.  Tumor was 5 cm with 100% invasion into the 

myometrium with focal serosal involvement.  Lymphovascular invasion positive, 

but no cervical stromal or parametrial involvement (stage IIIC-1, pT3A pPN1).  

Completed adjuvant chemotherapy for four cycles with carboplatin and Taxol in 

January 2015, followed by radiation therapy with external beam radiation and 

intracavitary radiation therapy.  Disease has been quiescent since that time.  



PAST MEDICAL HISTORY 

1.  Endometrial carcinoma, August 2014.  

2.  Melanoma of the cheek, 2006.  

3.  Pancreatitis, 2013.  

4.  Atrial fibrillation.  

5.  Ulcer, November 2018.  



PAST SURGICAL HISTORY 

1.  ISMAEL/BSO, 08/20/14.  

2.  Mastectomy.  

3.  Tonsillectomy.  



FAMILY HISTORY 

Cancer in the brother and mother.  



SOCIAL HISTORY 

Patient is .  She has two grown daughters.  She is a retired nurse from 

[employer] in the [place].  She has never smoked.  She does not 

drink alcohol.  



MEDICATIONS 

1.  Pantoprazole 40 mg daily.  

2.  Amiodarone 200 mg.  

3.  Gabapentin 600 mg b.i.d. 

4.  Calcium carbonate 500 mg b.i.d. 

5.  Lasix 20 mg p.r.n. 

6.  Xanax 0.5 mg, one half tab at bedtime p.r.n. 



REVIEW OF SYSTEMS 

A 12-point review of systems is performed and is negative except as stated 

above.  



PHYSICAL EXAMINATION 

VITAL SIGNS:  /88, P 80, R 16, temp 98.8, O2 sat 94%.  

GENERAL:  Patient is a well-developed, well-nourished female in no acute 

distress.  

HEAD:  Normocephalic, atraumatic.  

EYES:  Sclerae anicteric.  

MOUTH:  Moist mucous membranes.

NECK:  Supple.  No palpable adenopathy.  

LUNGS:  Clear bilaterally.  

CARDIOVASCULAR:  Heart rate regular, 82 per minute.  

ABDOMEN:  Soft, with active bowel sounds.  No organomegaly.  

EXTREMITIES:  Trade pedal edema, right greater than left.  

NEUROLOGIC:  Nonfocal, although patient requires walker to ambulate due to 

balance issues and peripheral neuropathy.  



LABORATORY 

CBC today reveals a WBC of 5.7, hemoglobin 14.0, hematocrit 41.4, platelets 

267,000.  CMP is within normal limits.   is pending.  



IMPRESSION

The patient is an 86-year-old female diagnosed with stage IIIC-1 endometrial 

adenocarcinoma in August 2014.  Underwent total abdominal hysterectomy/bilateral

salpingo-oophorectomy followed by adjuvant chemotherapy with carboplatin and 

Taxol, completed in January 2015.  She then completed adjuvant radiation 

therapy.  Disease has been quiescent since that time.  



1.  Endometrial adenocarcinoma.  No signs or symptoms of disease recurrence.  

 is pending today but has remained stable since completion of chemotherapy.

 

2.  Neuropathy.  Patient has chronic peripheral neuropathy in both feet and her 

right hand.  She feels it is somewhat helped by gabapentin, but does have issues

with balance.  She has participated in physical therapy/balance therapy, but 

feels she has "done all she can."  She does live alone, but is very careful at 

home.  

3.  Insomnia.  Requests a refill for Xanax 0.5 mg.  This was called in for her. 

She uses one half tab at bedtime on a very p.r.n. basis.  

4.  Osteoporosis.  She had been receiving Prolia on an every-six-month basis, 

last given on 01/03/19.  At this time, she defers further treatment.  

5.  Follow up in six months for continued care.  CBC, CMP, and  will be 

done at this time.  Patient states she will call to make this appointment.  

AN